# Patient Record
Sex: FEMALE | Race: WHITE | NOT HISPANIC OR LATINO | URBAN - METROPOLITAN AREA
[De-identification: names, ages, dates, MRNs, and addresses within clinical notes are randomized per-mention and may not be internally consistent; named-entity substitution may affect disease eponyms.]

---

## 2021-08-24 ENCOUNTER — TELEPHONE (OUTPATIENT)
Dept: PSYCHIATRY | Facility: CLINIC | Age: 61
End: 2021-08-24

## 2021-08-24 NOTE — TELEPHONE ENCOUNTER
Behavorial Health Outpatient Intake Questions    Referred by: Self referral      Please advised interviewee that they need to answer all questions truthfully to allow for best care and any misrepresentations of information may affect their ability to be seen at this clinic   => Was this discussed? Yes     Behavorial Health Outpatient Intake History -     Presenting Problem (in patient's words):   Bipolar    Are there any developmental disabilities? ? If yes, can they speak to you on the phone? If they are too limited to speak to you on phone, refer out Yes    Are you taking any psychiatric medications? Yes    => If yes, who prescribes? If yes, are they injectable  medications?   Latuda  Prozac    Does the patient have a language barrier or hearing impairment? No    Have you been treated at [facility] by a therapist or a doctor in the past? If yes, who? No    Has the patient been hospitalized for mental health? Yes   If yes, how long ago was last hospitalization and where was it?  2003 (more or less) - [place]    Do you actively use alcohol or marijuana or illegal substances? If yes, what and how much - refer out to Drug and alcohol treatment if use is excessive or daily use of illegal substances No concerns of substance abuse are reported.    Do you have a community treatment team or ? Yes, through [facility].    Legal History-     Does the patient have any history of arrests, correction/long term time, or DUIs? No  If Yes-  1) What types of charges?  2) When were they last incarcerated?  3) Are they currently on parole or probation?    Minor Child-    Who has custody of the child?     Is there a custody agreement?     If there is a custody agreement remind parent that they must bring a copy to the first appt or they will not be seen.     Intake Team, please check with provider before scheduling if flags come up such as:  - complex case  - legal history (other than DUI)  - communication barrier concerns are  present  - if, in your judgment, this needs further review    ACCEPTED as a patient Yes  => Appointment Date: 08/31/2021 at 9:00 a.m with Marylu Castrejon     Referred Elsewhere? No    Name of Insurance Co: [employer]- Medicaid/[facility]  Insurance ID# [id number]  Insurance Phone #  If ins is primary or secondary  If patient is a minor, parents information such as Name, D.O.B of guarantor.

## 2021-08-31 ENCOUNTER — SOCIAL WORK (OUTPATIENT)
Dept: BEHAVIORAL/MENTAL HEALTH CLINIC | Facility: CLINIC | Age: 61
End: 2021-08-31
Payer: COMMERCIAL

## 2021-08-31 DIAGNOSIS — F31.4 BIPOLAR DISORDER, CURRENT EPISODE DEPRESSED, SEVERE, WITHOUT PSYCHOTIC FEATURES (HCC): Primary | ICD-10-CM

## 2021-08-31 PROCEDURE — 90791 PSYCH DIAGNOSTIC EVALUATION: CPT | Performed by: COUNSELOR
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
## 2021-08-31 NOTE — BH TREATMENT PLAN
Anita Rajan  1960       Date of Initial Treatment Plan: 8/31/21   Date of Current Treatment Plan: 08/31/21    Treatment Plan Number 1     Strengths/Personal Resources for Self Care: Well establish coping skills, supportive family, caring , empathetic, compliance with medication    Diagnosis:   1  Bipolar disorder, current episode depressed, severe, without psychotic features ([facility] [address] )         Area of Needs: Healthy communication skills, coping skills, grief and loss      Long Term Goal 1: Decrease frequency, duration and intensity of depressive symptoms    Target Date: 2/27/22  Completion Date: 1/27/22         Short Term Objectives for Goal 1: 1  Teach and practice assetive communication skills 2  Increase use of relaxation skills  3  Increase use of healthy coping skills and self care    Long Term Goal 2: Process grief related symptoms    Target Date: 2/27/22  Completion Date: 1/27/22    Short Term Objectives for Goal 2: 1  Complete psycho education on grief and normalize feelings  2  Provide community resources for increased support as needed      GOAL 1: Modality: Individual 4x per month   Completion Date 2/27/22 and The person(s) responsible for carrying out the plan is  Rosalie Adames-Therapist    GOAL 2: Modality:  Individual 4x per month   Completion Date 2/27/22 and The person(s) responsible for carrying out the plan is  [employer]: Diagnosis and Treatment Plan explained to Aide Foster relates understanding diagnosis and is agreeable to Treatment Plan

## 2021-09-17 ENCOUNTER — TELEMEDICINE (OUTPATIENT)
Dept: BEHAVIORAL/MENTAL HEALTH CLINIC | Facility: CLINIC | Age: 61
End: 2021-09-17
Payer: COMMERCIAL

## 2021-09-17 DIAGNOSIS — F31.32 BIPOLAR 1 DISORDER, DEPRESSED, MODERATE (HCC): Primary | ICD-10-CM

## 2021-09-17 PROCEDURE — 90834 PSYTX W PT 45 MINUTES: CPT | Performed by: COUNSELOR

## 2021-09-20 PROBLEM — F31.32 BIPOLAR 1 DISORDER, DEPRESSED, MODERATE (HCC): Status: ACTIVE | Noted: 2021-09-20

## 2021-09-20 NOTE — PSYCH
Virtual Regular Visit    Verification of patient location:    Patient is located in the following state in which I hold an active license [place]      Assessment/Plan:    Problem List Items Addressed This Visit        Other    Bipolar 1 disorder, depressed, moderate ([address] ) - Primary          Goals addressed in session: Goal 1 and Goal 2          Reason for visit is   Chief Complaint   Patient presents with    Virtual Regular Visit        Encounter provider Young Fernandez    Provider located at [address] [place] [id number] [phone]      Recent Visits  No visits were found meeting these conditions  Showing recent visits within past 7 days and meeting all other requirements  Future Appointments  No visits were found meeting these conditions  Showing future appointments within next 150 days and meeting all other requirements       The patient was identified by name and date of birth  Nelly Taylor was informed that this is a telemedicine visit and that the visit is being conducted through[employer]avySw[employer] and patient was informed that this is a secure, HIPAA-compliant platform  She agrees to proceed     My office door was closed  No one else was in the room  She acknowledged consent and understanding of privacy and security of the video platform  The patient has agreed to participate and understands they can discontinue the visit at any time  Patient is aware this is a billable service  Subjective  Nelly Taylor is a 64 y o  female     D: Clinician met with Brenda Lynne via [employer] for individual therapy  Brenda Lynne presented to [facility] and was assisted by  in finding an office to complete session on her tablet  Brenda Guera reports some frustration at home due to her cat   Brenda Guera reports that she has been having trouble sleeping due to her cats increase in behavior at night and feeling worried that this will come an issues in her household as she is living with her daughter and their family  Dmitri Leslie reports that the cat as on a few occasions woken the family up and as been mischievous  Clinician explored Katherine's worries and discussed way to communicate this with her daughter  A: Dmitri Leslie was open and engaged in the session  She reports taking her medication as prescribed  She continues to reports cognitive issued related to memory  P: Continue to meet with Dmitri Leslie weekly for individual therapy  HPI     No past medical history on file  No past surgical history on file  No current outpatient medications on file  No current facility-administered medications for this visit  Not on File    Review of Systems    Video Exam    There were no vitals filed for this visit  Physical Exam     I spent 45 minutes directly with the patient during this visit    VIRTUAL VISIT DISCLAIMER    Alysha Saavedra verbally agrees to participate in [employer]  Pt is aware that [employer] could be limited without vital signs or the ability to perform a full hands-on physical Selena Irvin understands she or the provider may request at any time to terminate the video visit and request the patient to seek care or treatment in person

## 2021-09-23 ENCOUNTER — SOCIAL WORK (OUTPATIENT)
Dept: BEHAVIORAL/MENTAL HEALTH CLINIC | Facility: CLINIC | Age: 61
End: 2021-09-23
Payer: COMMERCIAL

## 2021-09-23 DIAGNOSIS — F31.32 BIPOLAR 1 DISORDER, DEPRESSED, MODERATE (HCC): Primary | ICD-10-CM

## 2021-09-23 PROCEDURE — 90834 PSYTX W PT 45 MINUTES: CPT | Performed by: COUNSELOR

## 2021-09-23 NOTE — PSYCH
Psychotherapy Provided: Individual Psychotherapy 45 minutes     Length of time in session: 45 minutes, follow up in 1 week    Encounter Diagnosis     ICD-10-CM    1  Bipolar 1 disorder, depressed, moderate (HCC)  F31 32        Goals addressed in session: Goal 1 and Goal 2     Current suicide risk : Low     D: Clinician met with Lizbeth Lora in person for individual therapy  Lizbeth Lora brought her tablet in for clinician to assist her with learning how to utilize telehealth [employer] for future appointments  Lizbeth Lora reports that she is doing well overall with sticking to her goals and has been able to pay off her credit card dept slowly  She express some concerns related to boundaries with her daughter and fear that she will begin to spend money on her again now that they are meeting more regularly  Clinician explored fears and ways to manage her boundaries with her daughter such as establishing the plan prior to meeting up with her and not straying from those plans and communicating her concerns to her daughter  Clinician checked in with Lizbeth Lora about her coping skills and regularly taking time for herself to relax which she reports that she has been reading  A: Lizbeth Lora was open and engaged in the session  She will continue with virtual appointments going forward due to risk of COVID19  P: Continue to meet with Lizbeth Lora weekly for individual therapy  Behavioral Health Treatment Plan [facility]: Diagnosis and Treatment Plan explained to Tay Carrera relates understanding diagnosis and is agreeable to Treatment Plan   Yes

## 2021-10-01 ENCOUNTER — TELEMEDICINE (OUTPATIENT)
Dept: BEHAVIORAL/MENTAL HEALTH CLINIC | Facility: CLINIC | Age: 61
End: 2021-10-01
Payer: COMMERCIAL

## 2021-10-01 DIAGNOSIS — F31.32 BIPOLAR 1 DISORDER, DEPRESSED, MODERATE (HCC): Primary | ICD-10-CM

## 2021-10-01 PROCEDURE — 90834 PSYTX W PT 45 MINUTES: CPT | Performed by: COUNSELOR

## 2021-10-15 ENCOUNTER — TELEMEDICINE (OUTPATIENT)
Dept: BEHAVIORAL/MENTAL HEALTH CLINIC | Facility: CLINIC | Age: 61
End: 2021-10-15
Payer: COMMERCIAL

## 2021-10-15 DIAGNOSIS — F31.32 BIPOLAR 1 DISORDER, DEPRESSED, MODERATE (HCC): Primary | ICD-10-CM

## 2021-10-15 PROCEDURE — 90834 PSYTX W PT 45 MINUTES: CPT | Performed by: COUNSELOR

## 2021-10-22 ENCOUNTER — TELEMEDICINE (OUTPATIENT)
Dept: BEHAVIORAL/MENTAL HEALTH CLINIC | Facility: CLINIC | Age: 61
End: 2021-10-22
Payer: COMMERCIAL

## 2021-10-22 DIAGNOSIS — F31.32 BIPOLAR 1 DISORDER, DEPRESSED, MODERATE (HCC): Primary | ICD-10-CM

## 2021-10-22 PROCEDURE — 90834 PSYTX W PT 45 MINUTES: CPT | Performed by: COUNSELOR

## 2021-10-29 ENCOUNTER — TELEMEDICINE (OUTPATIENT)
Dept: BEHAVIORAL/MENTAL HEALTH CLINIC | Facility: CLINIC | Age: 61
End: 2021-10-29
Payer: COMMERCIAL

## 2021-10-29 DIAGNOSIS — F31.32 BIPOLAR 1 DISORDER, DEPRESSED, MODERATE (HCC): Primary | ICD-10-CM

## 2021-10-29 PROCEDURE — 90834 PSYTX W PT 45 MINUTES: CPT | Performed by: COUNSELOR

## 2021-11-05 ENCOUNTER — TELEMEDICINE (OUTPATIENT)
Dept: BEHAVIORAL/MENTAL HEALTH CLINIC | Facility: CLINIC | Age: 61
End: 2021-11-05
Payer: COMMERCIAL

## 2021-11-05 DIAGNOSIS — F31.32 BIPOLAR 1 DISORDER, DEPRESSED, MODERATE (HCC): Primary | ICD-10-CM

## 2021-11-05 PROCEDURE — 90834 PSYTX W PT 45 MINUTES: CPT | Performed by: COUNSELOR

## 2021-11-12 ENCOUNTER — TELEMEDICINE (OUTPATIENT)
Dept: BEHAVIORAL/MENTAL HEALTH CLINIC | Facility: CLINIC | Age: 61
End: 2021-11-12
Payer: COMMERCIAL

## 2021-11-12 DIAGNOSIS — F31.32 BIPOLAR 1 DISORDER, DEPRESSED, MODERATE (HCC): Primary | ICD-10-CM

## 2021-11-12 PROCEDURE — 90832 PSYTX W PT 30 MINUTES: CPT | Performed by: COUNSELOR

## 2021-11-19 ENCOUNTER — TELEMEDICINE (OUTPATIENT)
Dept: BEHAVIORAL/MENTAL HEALTH CLINIC | Facility: CLINIC | Age: 61
End: 2021-11-19
Payer: COMMERCIAL

## 2021-11-19 DIAGNOSIS — F31.32 BIPOLAR 1 DISORDER, DEPRESSED, MODERATE (HCC): Primary | ICD-10-CM

## 2021-11-19 PROCEDURE — 90834 PSYTX W PT 45 MINUTES: CPT | Performed by: COUNSELOR

## 2021-12-03 ENCOUNTER — TELEMEDICINE (OUTPATIENT)
Dept: BEHAVIORAL/MENTAL HEALTH CLINIC | Facility: CLINIC | Age: 61
End: 2021-12-03
Payer: COMMERCIAL

## 2021-12-03 DIAGNOSIS — F31.32 BIPOLAR 1 DISORDER, DEPRESSED, MODERATE (HCC): Primary | ICD-10-CM

## 2021-12-03 PROCEDURE — 90834 PSYTX W PT 45 MINUTES: CPT | Performed by: COUNSELOR

## 2021-12-10 ENCOUNTER — TELEMEDICINE (OUTPATIENT)
Dept: BEHAVIORAL/MENTAL HEALTH CLINIC | Facility: CLINIC | Age: 61
End: 2021-12-10
Payer: COMMERCIAL

## 2021-12-10 DIAGNOSIS — F31.32 BIPOLAR 1 DISORDER, DEPRESSED, MODERATE (HCC): Primary | ICD-10-CM

## 2021-12-10 PROCEDURE — 90834 PSYTX W PT 45 MINUTES: CPT | Performed by: COUNSELOR

## 2021-12-17 ENCOUNTER — TELEMEDICINE (OUTPATIENT)
Dept: BEHAVIORAL/MENTAL HEALTH CLINIC | Facility: CLINIC | Age: 61
End: 2021-12-17
Payer: COMMERCIAL

## 2021-12-17 DIAGNOSIS — F31.32 BIPOLAR 1 DISORDER, DEPRESSED, MODERATE (HCC): Primary | ICD-10-CM

## 2021-12-17 PROCEDURE — 90834 PSYTX W PT 45 MINUTES: CPT | Performed by: COUNSELOR

## 2021-12-23 ENCOUNTER — TELEMEDICINE (OUTPATIENT)
Dept: BEHAVIORAL/MENTAL HEALTH CLINIC | Facility: CLINIC | Age: 61
End: 2021-12-23
Payer: COMMERCIAL

## 2021-12-23 DIAGNOSIS — F31.32 BIPOLAR 1 DISORDER, DEPRESSED, MODERATE (HCC): Primary | ICD-10-CM

## 2021-12-23 PROCEDURE — 90834 PSYTX W PT 45 MINUTES: CPT | Performed by: COUNSELOR

## 2022-01-07 ENCOUNTER — TELEMEDICINE (OUTPATIENT)
Dept: BEHAVIORAL/MENTAL HEALTH CLINIC | Facility: CLINIC | Age: 62
End: 2022-01-07
Payer: COMMERCIAL

## 2022-01-07 DIAGNOSIS — F31.32 BIPOLAR 1 DISORDER, DEPRESSED, MODERATE (HCC): Primary | ICD-10-CM

## 2022-01-07 PROCEDURE — 90834 PSYTX W PT 45 MINUTES: CPT | Performed by: COUNSELOR

## 2022-01-07 NOTE — PSYCH
Virtual Regular Visit    Verification of patient location:    Patient is located in the following state in which I hold an active license [place]      Assessment/Plan:    Problem List Items Addressed This Visit        Other    Bipolar 1 disorder, depressed, moderate ([facility] [address] ) - Primary          Goals addressed in session: Goal 1 and Goal 2          Reason for visit is No chief complaint on file  Encounter provider Betsy Londono    Provider located at [address] [place] [id number] [phone]      Recent Visits  No visits were found meeting these conditions  Showing recent visits within past 7 days and meeting all other requirements  Future Appointments  No visits were found meeting these conditions  Showing future appointments within next 150 days and meeting all other requirements       The patient was identified by name and date of birth  Vimal Waller was informed that this is a telemedicine visit and that the visit is being conducted through[employer] and patient was informed this is a secure, HIPAA-complaint platform  She agrees to proceed     My office door was closed  No one else was in the room  She acknowledged consent and understanding of privacy and security of the video platform  The patient has agreed to participate and understands they can discontinue the visit at any time  Patient is aware this is a billable service  Subjective  Vimal Waller is a 64 y o  female     D: Clinician met with Shy Woodard via telehealth for individual therapy  Shy Woodard report cycling from hypomanic and depression with increased environmental stressors  Clinician explores symptoms and stressors and ways Shy Woodard is working on managing symptoms with boundaries  Shy Woodard reports limited interaction with her daughter as she refuses to follow through with CYS recommendations to get her children back   Shy Woodard reports continuing to take emi medication as prescribed and turning off her phone regularly to avoid contact with daughter  A: Jamaal Becerra was open and engaged in the session and reports awareness of quick changed mood and intention to follow through with her doctor  P: Continue to meet with Jamaal Becerra weekly for individual therapy  HPI     No past medical history on file  No past surgical history on file  No current outpatient medications on file  No current facility-administered medications for this visit  Not on File    Review of Systems    Video Exam    There were no vitals filed for this visit  Physical Exam     I spent 45 minutes directly with the patient during this visit    VIRTUAL VISIT DISCLAIMER    Vickie Spatz verbally agrees to participate in [employer]  Pt is aware that [employer] could be limited without vital signs or the ability to perform a full hands-on physical Sonali Ann understands she or the provider may request at any time to terminate the video visit and request the patient to seek care or treatment in person

## 2022-01-14 ENCOUNTER — TELEMEDICINE (OUTPATIENT)
Dept: BEHAVIORAL/MENTAL HEALTH CLINIC | Facility: CLINIC | Age: 62
End: 2022-01-14
Payer: COMMERCIAL

## 2022-01-14 DIAGNOSIS — F31.32 BIPOLAR 1 DISORDER, DEPRESSED, MODERATE (HCC): Primary | ICD-10-CM

## 2022-01-14 PROCEDURE — 90834 PSYTX W PT 45 MINUTES: CPT | Performed by: COUNSELOR

## 2022-01-14 NOTE — PSYCH
Virtual Regular Visit    Verification of patient location:    Patient is located in the following state in which I hold an active license [place]      Assessment/Plan:    Problem List Items Addressed This Visit        Other    Bipolar 1 disorder, depressed, moderate ([address] ) - Primary          Goals addressed in session: Goal 1 and Goal 2          Reason for visit is No chief complaint on file  Encounter provider Renato Iqbal    Provider located at [address] [place] [id number] [phone]      Recent Visits  Date Type Provider Dept   01/07/22 310 [facility]   Showing recent visits within past 7 days and meeting all other requirements  Future Appointments  No visits were found meeting these conditions  Showing future appointments within next 150 days and meeting all other requirements       The patient was identified by name and date of birth  Emi Hyde was informed that this is a telemedicine visit and that the visit is being conducted throughEpic Embedded and patient was informed this is a secure, HIPAA-complaint platform  She agrees to proceed     My office door was closed  No one else was in the room  She acknowledged consent and understanding of privacy and security of the video platform  The patient has agreed to participate and understands they can discontinue the visit at any time  Patient is aware this is a billable service  Subjective  Emi Hyde is a 64 y o  female     D: Clinician met with [place] Garcia via telehealth for individual therapy  [place] Garcia reports worry over her daughters declining mental health  She reports worry over her health and the wellbeing of her grandchildren  Clinician validated and normalized her feelings and discussed her boundaries and how they are helping to maintain distance from stress    A: [place] Garcia presented with stable mood and affect and reports she is taking her medication as prescribed and following through with doctors appointments  P: Continue to meet with Patric Buerger weekly for individual therapy  HPI     No past medical history on file  No past surgical history on file  No current outpatient medications on file  No current facility-administered medications for this visit  Not on File    Review of Systems    Video Exam    There were no vitals filed for this visit  Physical Exam     I spent 45 minutes directly with the patient during this visit    VIRTUAL VISIT DISCLAIMER    Domo Segundo verbally agrees to participate in [employer]  Pt is aware that [employer] could be limited without vital signs or the ability to perform a full hands-on physical Della Setting understands she or the provider may request at any time to terminate the video visit and request the patient to seek care or treatment in person

## 2022-01-21 ENCOUNTER — TELEMEDICINE (OUTPATIENT)
Dept: BEHAVIORAL/MENTAL HEALTH CLINIC | Facility: CLINIC | Age: 62
End: 2022-01-21
Payer: COMMERCIAL

## 2022-01-21 DIAGNOSIS — F31.32 BIPOLAR 1 DISORDER, DEPRESSED, MODERATE (HCC): Primary | ICD-10-CM

## 2022-01-21 PROCEDURE — 90834 PSYTX W PT 45 MINUTES: CPT | Performed by: COUNSELOR

## 2022-01-21 NOTE — PSYCH
Virtual Regular Visit    Verification of patient location:    Patient is located in the following state in which I hold an active license [place]      Assessment/Plan:    Problem List Items Addressed This Visit        Other    Bipolar 1 disorder, depressed, moderate ([address] ) - Primary          Goals addressed in session: Goal 1 and Goal 2          Reason for visit is No chief complaint on file  Encounter provider Micha Moreno    Provider located at [address] [place] [id number] [phone]      Recent Visits  Date Type Provider Dept   01/14/22 310 [facility]   Showing recent visits within past 7 days and meeting all other requirements  Future Appointments  No visits were found meeting these conditions  Showing future appointments within next 150 days and meeting all other requirements       The patient was identified by name and date of birth  Jhon Bhakta was informed that this is a telemedicine visit and that the visit is being conducted throughEpic Embedded and patient was informed this is a secure, HIPAA-complaint platform  She agrees to proceed     My office door was closed  No one else was in the room  She acknowledged consent and understanding of privacy and security of the video platform  The patient has agreed to participate and understands they can discontinue the visit at any time  Patient is aware this is a billable service  Subjective  Jhon Bhakta is a 64 y o  female     D: Clinician met with West allis via telehealth for individual therapy  West allyarelis reports a feeling of not belonging and constant worry about her cats behavior at her daughters house  She reports fear that her daughter would as her to get rid of her cats  Clinician explored increased stress and possible solutions to decrease symptoms   Clinician encouraged healthy communication with daughter and express her feelings  Clinician explored past communication with others and ways to impove openness  A: Leopoldo Orozco was open and engaged in the session and presented with stable mood and affect  P: Continue to meet with Leopoldo Orozco weekly for individual therapy  HPI     No past medical history on file  No past surgical history on file  No current outpatient medications on file  No current facility-administered medications for this visit  Not on File    Review of Systems    Video Exam    There were no vitals filed for this visit  Physical Exam     I spent 45 minutes directly with the patient during this visit    VIRTUAL VISIT DISCLAIMER    Stephany Hunter verbally agrees to participate in [employer]  Pt is aware that [employer] could be limited without vital signs or the ability to perform a full hands-on physical Boom Dent understands she or the provider may request at any time to terminate the video visit and request the patient to seek care or treatment in person

## 2022-01-28 ENCOUNTER — TELEMEDICINE (OUTPATIENT)
Dept: BEHAVIORAL/MENTAL HEALTH CLINIC | Facility: CLINIC | Age: 62
End: 2022-01-28
Payer: COMMERCIAL

## 2022-01-28 DIAGNOSIS — F31.32 BIPOLAR 1 DISORDER, DEPRESSED, MODERATE (HCC): Primary | ICD-10-CM

## 2022-01-28 PROCEDURE — 90834 PSYTX W PT 45 MINUTES: CPT | Performed by: COUNSELOR

## 2022-01-28 NOTE — PSYCH
Virtual Regular Visit    Verification of patient location:    Patient is located in the following state in which I hold an active license [place]      Assessment/Plan:    Problem List Items Addressed This Visit        Other    Bipolar 1 disorder, depressed, moderate ([address] ) - Primary          Goals addressed in session: Goal 1 and Goal 2          Reason for visit is No chief complaint on file  Encounter provider Bentley George    Provider located at [address] [place] [address] [phone]      Recent Visits  Date Type Provider Dept   01/21/22 310 [facility]   Showing recent visits within past 7 days and meeting all other requirements  Future Appointments  No visits were found meeting these conditions  Showing future appointments within next 150 days and meeting all other requirements       The patient was identified by name and date of birth  Pura Andrade was informed that this is a telemedicine visit and that the visit is being conducted throughEpic Embedded and patient was informed this is a secure, HIPAA-complaint platform  She agrees to proceed  My office door was closed  No one else was in the room  She acknowledged consent and understanding of privacy and security of the video platform  The patient has agreed to participate and understands they can discontinue the visit at any time  Patient is aware this is a billable service  Subjective  Pura Andrade is a 64 y o  female     D: Clinician met with Liliya Bergeron via telehealth for individual therapy  Liliya Bergreon discussed recent increase in communication and putting herself out there more to her daughter to connect and build better relationship  Clinician explored and reinforced this  Liliya Bergeron reports overall feeling down in the last few weeks   Clinician normalized feelings and possible ways to improve mood through coping skills and regular exercise  Ozzy Hardin discussed continued efforts to set and maintain boundaries with her youngest daughter  A: Ozzy Wilfred was open and engaged in the session  She reports that she has recently had her medication increased by her doctor and that she is taking the medication as directed  P: Continue to meet with Ozzy Hardin weekly for individual therapy  HPI     No past medical history on file  No past surgical history on file  No current outpatient medications on file  No current facility-administered medications for this visit  Not on File    Review of Systems    Video Exam    There were no vitals filed for this visit  Physical Exam     I spent 45 minutes directly with the patient during this visit    VIRTUAL VISIT DISCLAIMER    Rossy Cook verbally agrees to participate in [employer]  Pt is aware that [employer] could be limited without vital signs or the ability to perform a full hands-on physical Enrico Banks understands she or the provider may request at any time to terminate the video visit and request the patient to seek care or treatment in person

## 2022-02-04 ENCOUNTER — TELEMEDICINE (OUTPATIENT)
Dept: BEHAVIORAL/MENTAL HEALTH CLINIC | Facility: CLINIC | Age: 62
End: 2022-02-04
Payer: COMMERCIAL

## 2022-02-04 DIAGNOSIS — F31.32 BIPOLAR 1 DISORDER, DEPRESSED, MODERATE (HCC): Primary | ICD-10-CM

## 2022-02-04 PROCEDURE — 90834 PSYTX W PT 45 MINUTES: CPT | Performed by: COUNSELOR

## 2022-02-04 NOTE — PSYCH
Virtual Regular Visit    Verification of patient location:    Patient is located in the following state in which I hold an active license [place]      Assessment/Plan:    Problem List Items Addressed This Visit        Other    Bipolar 1 disorder, depressed, moderate ([address] ) - Primary          Goals addressed in session: Goal 1 and Goal 2          Reason for visit is No chief complaint on file  Encounter provider Isaiah Mullen    Provider located at [address] [place] [id number] [phone]      Recent Visits  Date Type Provider Dept   01/28/22 310 [facility]   Showing recent visits within past 7 days and meeting all other requirements  Future Appointments  No visits were found meeting these conditions  Showing future appointments within next 150 days and meeting all other requirements       The patient was identified by name and date of birth  Darrell Ramos was informed that this is a telemedicine visit and that the visit is being conducted throughEpic Embedded and patient was informed this is a secure, HIPAA-complaint platform  She agrees to proceed     My office door was closed  No one else was in the room  She acknowledged consent and understanding of privacy and security of the video platform  The patient has agreed to participate and understands they can discontinue the visit at any time  Patient is aware this is a billable service  Subjective  Darrell Ramos is a 64 y o  female     D: Clinician met with Jane Geiger via telehealth for individual therapy  Jane Geiger processed upcoming social plans over the weekend to spend time with family for a birthday party  Clinician explored benefits of social interactions and focusing on hobbies and her own interests  Clinician explored possible activities to get her out of the home   She discussed focusing more of her [address] and plan to go to the arts store in order to check out other art options  Clinician discussed memory related concerns and symptoms of anxiety that can have an impact on memory  A: Alice Stevens was open and engaged in the session  She reports taking her medication as prescribed and efforts to work on healthy coping skills  P: Continue to meet with Alice Stevens every week for individual therapy  HPI     No past medical history on file  No past surgical history on file  No current outpatient medications on file  No current facility-administered medications for this visit  Not on File    Review of Systems    Video Exam    There were no vitals filed for this visit  Physical Exam     I spent 45 minutes directly with the patient during this visit    VIRTUAL VISIT DISCLAIMER    Nereida Gorman verbally agrees to participate in [employer]  Pt is aware that [employer] could be limited without vital signs or the ability to perform a full hands-on physical Denia Hallmark understands she or the provider may request at any time to terminate the video visit and request the patient to seek care or treatment in person

## 2022-02-11 ENCOUNTER — TELEMEDICINE (OUTPATIENT)
Dept: BEHAVIORAL/MENTAL HEALTH CLINIC | Facility: CLINIC | Age: 62
End: 2022-02-11
Payer: COMMERCIAL

## 2022-02-11 DIAGNOSIS — F31.32 BIPOLAR 1 DISORDER, DEPRESSED, MODERATE (HCC): Primary | ICD-10-CM

## 2022-02-11 PROCEDURE — 90834 PSYTX W PT 45 MINUTES: CPT | Performed by: COUNSELOR

## 2022-02-11 NOTE — PSYCH
Virtual Regular Visit    Verification of patient location:    Patient is located in the following state in which I hold an [place] license [place]      Assessment/Plan:    Problem List Items Addressed This Visit        Other    Bipolar 1 disorder, depressed, moderate ([address] ) - Primary          Goals addressed in session: Goal 1 and Goal 2          Reason for visit is No chief complaint on file  Encounter provider Adolph Klinefelter    Provider located at [address] [place] [id number] [phone]      Recent Visits  Date Type Provider Dept   02/04/22 310 [facility]   Showing recent visits within past 7 days and meeting all other requirements  Future Appointments  No visits were found meeting these conditions  Showing future appointments within next 150 days and meeting all other requirements       The patient was identified by name and date of birth  Gamaliel Guzmán was informed that this is a telemedicine visit and that the visit is being conducted throughEpic Embedded and patient was informed this is a secure, HIPAA-complaint platform  She agrees to proceed     My office door was closed  No one else was in the room  She acknowledged consent and understanding of privacy and security of the video platform  The patient has agreed to participate and understands they can discontinue the visit at any time  Patient is aware this is a billable service  Subjective  Gamaliel Guzmán is a 64 y o  female     D: Clinician met with Teri Dudley via telehealth for individual therapy  Teri Dudley reports hearing about a death of an old friend during the week and having increased grief related symptoms  Clinician explored symptoms and validated grief  Teri Octavia reports thinking more about her own life and changes she would like to make to be more mindful and increase happiness and chhaya  Clinician discussed small changes she would like to make and benefits of this  General Johnson reports stress related to her youngest daughters health and ways she is managing symptoms  A: General Johnson was open and engaged in the session and reports taking her medication as prescribed  P: Continue to meet with General Johnson weekly for individual therapy  HPI     No past medical history on file  No past surgical history on file  No current outpatient medications on file  No current facility-administered medications for this visit  Not on File    Review of Systems    Video Exam    There were no vitals filed for this visit  Physical Exam     I spent 45 minutes directly with the patient during this visit    VIRTUAL VISIT DISCLAIMER    Kristen Barriga verbally agrees to participate in [employer]  Pt is aware that [employer] could be limited without vital signs or the ability to perform a full hands-on physical Yadira Alert understands she or the provider may request at any time to terminate the video visit and request the patient to seek care or treatment in person

## 2022-02-18 ENCOUNTER — TELEMEDICINE (OUTPATIENT)
Dept: BEHAVIORAL/MENTAL HEALTH CLINIC | Facility: CLINIC | Age: 62
End: 2022-02-18
Payer: COMMERCIAL

## 2022-02-18 DIAGNOSIS — F31.32 BIPOLAR 1 DISORDER, DEPRESSED, MODERATE (HCC): Primary | ICD-10-CM

## 2022-02-18 PROCEDURE — 90834 PSYTX W PT 45 MINUTES: CPT | Performed by: COUNSELOR

## 2022-02-18 NOTE — PSYCH
Virtual Regular Visit    Verification of patient location:    Patient is located in the following state in which I hold an [place] license [place]      Assessment/Plan:    Problem List Items Addressed This Visit        Other    Bipolar 1 disorder, depressed, moderate ([address] ) - Primary          Goals addressed in session: Goal 1 and Goal 2          Reason for visit is No chief complaint on file  Encounter provider Burgess Coyne    Provider located at [address] [place] [address] [phone]      Recent Visits  Date Type Provider Dept   02/11/22 310 [facility]   Showing recent visits within past 7 days and meeting all other requirements  Future Appointments  No visits were found meeting these conditions  Showing future appointments within next 150 days and meeting all other requirements       The patient was identified by name and date of birth  Bridget Adorno was informed that this is a telemedicine visit and that the visit is being conducted throughEpic Embedded and patient was informed this is a secure, HIPAA-complaint platform  She agrees to proceed  My office door was closed  No one else was in the room  She acknowledged consent and understanding of privacy and security of the video platform  The patient has agreed to participate and understands they can discontinue the visit at any time  Patient is aware this is a billable service  Subjective  Bridget Adorno is a 64 y o  female     D: Clinician met with Cassia Woodard via telehealth for individual therapy  Cassia Woodard discussed incident with son in law that left her feeling uncomfortable and hurt  Clinician explored and discussed cognitive restructuring  Cassia Woodard was able to discussed possible ways to communicate feeling to her daughter and son in law and work on better bonding in the family   Clinician discussed treatment plan goals and updated treatment plan  A: Antonino Hernadez presented with stable mood and affect and reports taking her medication as prescribed  P: Continue to meet with Antonino Hernadez weekly for individual therapy  HPI     No past medical history on file  No past surgical history on file  No current outpatient medications on file  No current facility-administered medications for this visit  Allergies   Allergen Reactions    Lithium Dizziness and Headache    Depakote [Valproic Acid] Rash    Geodon [Ziprasidone] Rash    Lamictal [Lamotrigine] Rash       Review of Systems    Video Exam    There were no vitals filed for this visit  Physical Exam     I spent 45 minutes directly with the patient during this visit    VIRTUAL VISIT DISCLAIMER    Sharonda Rapp verbally agrees to participate in [employer]  Pt is aware that [employer] could be limited without vital signs or the ability to perform a full hands-on physical Edith Saavedra understands she or the provider may request at any time to terminate the video visit and request the patient to seek care or treatment in person

## 2022-02-18 NOTE — BH TREATMENT PLAN
Brina Kugel  1960         Date of Initial Treatment Plan: 8/31/21   Date of Current Treatment Plan: 2/18/22     Treatment Plan Number 2     Strengths/Personal Resources for Self Care: Well establish coping skills, supportive family, caring , empathetic, compliant with medication     Diagnosis:   1  Bipolar disorder, current episode depressed, severe, without psychotic features ([facility] [address] )            Area of Needs: Healthy communication skills, coping skills, grief and loss        Long Term Goal 1: Decrease frequency, duration and intensity of depressive symptoms     Target Date: 7/18/22  Completion Date: 8/18/22         Short Term Objectives for Goal 1: 1  Teach and practice assetive communication skills 2  Increase use of relaxation skills  3  Increase use of healthy coping skills and self care     Long Term Goal 2: Process grief related symptoms     Target Date: 7/18/22  Completion Date: 8/18/22     Short Term Objectives for Goal 2: 1  Complete psycho education on grief and normalize feelings  2  Provide community resources for increased support as needed        GOAL 1: Modality: Individual 4x per month   Completion Date 8/18/22 and The person(s) responsible for carrying out the plan is  Jamaal Becerra and Caden Benitez     GOAL 2: Modality:  Individual 4x per month   Completion Date 8/18/22 and The person(s) responsible for carrying out the plan is  Jamaal Becerra and Ed Tong: Diagnosis and Treatment Plan explained to Eron Soriano relates understanding diagnosis and is agreeable to Treatment Plan  Jamaal Becerra gave verbal consent for the completion of the treatment plan via telehealth due to [address] social distancing

## 2022-02-25 ENCOUNTER — TELEMEDICINE (OUTPATIENT)
Dept: BEHAVIORAL/MENTAL HEALTH CLINIC | Facility: CLINIC | Age: 62
End: 2022-02-25
Payer: COMMERCIAL

## 2022-02-25 DIAGNOSIS — F31.32 BIPOLAR 1 DISORDER, DEPRESSED, MODERATE (HCC): Primary | ICD-10-CM

## 2022-02-25 PROCEDURE — 90834 PSYTX W PT 45 MINUTES: CPT | Performed by: COUNSELOR

## 2022-02-25 NOTE — PSYCH
Virtual Regular Visit    Verification of patient location:    Patient is located in the following state in which I hold an active license [place]      Assessment/Plan:    Problem List Items Addressed This Visit        Other    Bipolar 1 disorder, depressed, moderate ([address] ) - Primary          Goals addressed in session: Goal 1 and Goal 2          Reason for visit is No chief complaint on file  Encounter provider Betsy Londono    Provider located at [address] [place] [id number] [phone]      Recent Visits  Date Type Provider Dept   02/18/22 310 [facility]   Showing recent visits within past 7 days and meeting all other requirements  Future Appointments  No visits were found meeting these conditions  Showing future appointments within next 150 days and meeting all other requirements       The patient was identified by name and date of birth  Vimal Waller was informed that this is a telemedicine visit and that the visit is being conducted throughEpic Embedded and patient was informed this is a secure, HIPAA-complaint platform  She agrees to proceed     My office door was closed  No one else was in the room  She acknowledged consent and understanding of privacy and security of the video platform  The patient has agreed to participate and understands they can discontinue the visit at any time  Patient is aware this is a billable service  Subjective  Vimal Waller is a 64 y o  female     D: Clinician met with Shy Woodard via telehealth for individual therapy  Shy Woodard reports anniversary of her paramours death two years ago and continued grief related symptoms  Clinician reflected and normalized feelings and processed grief over time  Shy Woodard reports plans to look through old pictures and reflect on paramour and good memories   Clinician discussed other supports that she can reach out to if needed  Sj Holden reports speaking with paramours family member and her daughter  Clinician reinforced and encouraged this  Sj Holden reports that she will be home alone for the next week as her daughters family is taking a vacation  Sj Holden reports plan to be productive but also take time for self care  A: Sj Holden was open and engaged in the session  She reports decrease of medication by doctor and feeling good with the titration process so far  P: Continue to meet with Sj Holden weekly for individual therapy  HPI     No past medical history on file  No past surgical history on file  No current outpatient medications on file  No current facility-administered medications for this visit  Allergies   Allergen Reactions    Lithium Dizziness and Headache    Depakote [Valproic Acid] Rash    Geodon [Ziprasidone] Rash    Lamictal [Lamotrigine] Rash       Review of Systems    Video Exam    There were no vitals filed for this visit  Physical Exam     I spent 45 minutes directly with the patient during this visit    VIRTUAL VISIT DISCLAIMER    Salina Macedo verbally agrees to participate in [employer]  Pt is aware that [employer] could be limited without vital signs or the ability to perform a full hands-on physical Hola Kincaid understands she or the provider may request at any time to terminate the video visit and request the patient to seek care or treatment in person

## 2022-03-11 ENCOUNTER — TELEMEDICINE (OUTPATIENT)
Dept: BEHAVIORAL/MENTAL HEALTH CLINIC | Facility: CLINIC | Age: 62
End: 2022-03-11
Payer: COMMERCIAL

## 2022-03-11 DIAGNOSIS — F31.32 BIPOLAR 1 DISORDER, DEPRESSED, MODERATE (HCC): Primary | ICD-10-CM

## 2022-03-11 PROCEDURE — 90834 PSYTX W PT 45 MINUTES: CPT | Performed by: COUNSELOR

## 2022-03-11 NOTE — PSYCH
Virtual Regular Visit    Verification of patient location:    Patient is located in the following state in which I hold an [place] license [place]      Assessment/Plan:    Problem List Items Addressed This Visit        Other    Bipolar 1 disorder, depressed, moderate ([facility] [address] ) - Primary          Goals addressed in session: Goal 1 and Goal 2          Reason for visit is No chief complaint on file  Encounter provider Isi Bowers    Provider located at [address] [place] [address] [address] [place] [address] [phone]      Recent Visits  No visits were found meeting these conditions  Showing recent visits within past 7 days and meeting all other requirements  Future Appointments  No visits were found meeting these conditions  Showing future appointments within next 150 days and meeting all other requirements       The patient was identified by name and date of birth  Soo Barrientos was informed that this is a telemedicine visit and that the visit is being conducted [email or web address] Embedded and patient was informed this is a secure, HIPAA-complaint platform  She agrees to proceed     My office door was closed  No one else was in the room  She acknowledged consent and understanding of privacy and security of the video platform  The patient has agreed to participate and understands they can discontinue the visit at any time  Patient is aware this is a billable service  Subjective  Soo Barrientos is a 64 y o  female      D: Clinician met with Letty Pimentel via telehealth for individual therapy  Letty Pimentel discussed past week spend at her home while her daughters family is away  She reports enjoying the time by herself and reports being busy visiting with family and running errands  She reports conflict with daughter when she returned due to not following rules that she put into place   Clinician processed this and ways to communicate with daughter about this and understanding of boundary  Vianney Ashby discussed follow through with her doctor about medication concerns and working to figure out SSI in there future  Clinician encouraged and praised follow through  A: Vianney Ashby was open and engaged in the session and presented with stable mood and affect  P: Continue to meet with Vianney Ashby weekly for individual therapy  HPI      No past medical history on file  No past surgical history on file  No current outpatient medications on file  No current facility-administered medications for this visit  Allergies   Allergen Reactions    Lithium Dizziness and Headache    Depakote [Valproic Acid] Rash    Geodon [Ziprasidone] Rash    Lamictal [Lamotrigine] Rash       Review of Systems    Video Exam    There were no vitals filed for this visit  Physical Exam     I spent 45 minutes directly with the patient during this visit    VIRTUAL VISIT DISCLAIMER    Tito Rucker verbally agrees to participate in [employer]  Pt is aware that [employer] could be limited without vital signs or the ability to perform a full hands-on physical Kristin Ma understands she or the provider may request at any time to terminate the video visit and request the patient to seek care or treatment in person

## 2022-03-16 ENCOUNTER — TELEPHONE (OUTPATIENT)
Dept: PSYCHIATRY | Facility: CLINIC | Age: 62
End: 2022-03-16

## 2022-03-18 ENCOUNTER — TELEMEDICINE (OUTPATIENT)
Dept: BEHAVIORAL/MENTAL HEALTH CLINIC | Facility: CLINIC | Age: 62
End: 2022-03-18
Payer: COMMERCIAL

## 2022-03-18 DIAGNOSIS — F31.32 BIPOLAR 1 DISORDER, DEPRESSED, MODERATE (HCC): Primary | ICD-10-CM

## 2022-03-18 PROCEDURE — 90834 PSYTX W PT 45 MINUTES: CPT | Performed by: COUNSELOR

## 2022-03-18 NOTE — PSYCH
Virtual Regular Visit    Verification of patient location:    Patient is located in the following state in which I hold an active license [place]      Assessment/Plan:    Problem List Items Addressed This Visit        Other    Bipolar 1 disorder, depressed, moderate ([address] ) - Primary          Goals addressed in session: Goal 1 and Goal 2          Reason for visit is No chief complaint on file  Encounter provider Buck Fragoso    Provider located at [address] [place] [id number] [phone]      Recent Visits  Date Type Provider Dept   03/11/22 310 [facility]   Showing recent visits within past 7 days and meeting all other requirements  Future Appointments  No visits were found meeting these conditions  Showing future appointments within next 150 days and meeting all other requirements       The patient was identified by name and date of birth  Seng Verde was informed that this is a telemedicine visit and that the visit is being conducted through[employer] Embedded and patient was informed this is a secure, HIPAA-complaint platform  She agrees to proceed     My office door was closed  No one else was in the room  She acknowledged consent and understanding of privacy and security of the video platform  The patient has agreed to participate and understands they can discontinue the visit at any time  Patient is aware this is a billable service  Subjective  Seng Verde is a 58 y o  female     D: Clinician met with Brittany Mazariegos via telehealth for individual therapy  Giovannytriston Him discussed stressors related to her daughters continued negative behaviors  She processed thoughts of going to see her and possible consequences if she were to  Lonzie Him reports feeling guilty about not being able to help  Clinician explored guilt and reframed negative thinking   She reports attempts at maintaining boundaries  Clinician gave feedback and praised self awareness of importance of taking space and putting her own mental health first   A: Cristal Ramírez presented with stable mood and affect  She reports taking her medication as prescribed and has reached out for social support as needed  P: Continue to meet with Cristal Ramírez weekly for individual therapy  HPI     No past medical history on file  No past surgical history on file  No current outpatient medications on file  No current facility-administered medications for this visit  Allergies   Allergen Reactions    Lithium Dizziness and Headache    Depakote [Valproic Acid] Rash    Geodon [Ziprasidone] Rash    Lamictal [Lamotrigine] Rash       Review of Systems    Video Exam    There were no vitals filed for this visit  Physical Exam     I spent 45 minutes directly with the patient during this visit    VIRTUAL VISIT DISCLAIMER    Angelito Lechuga verbally agrees to participate in [employer]  Pt is aware that [employer] could be limited without vital signs or the ability to perform a full hands-on physical Mily Poe understands she or the provider may request at any time to terminate the video visit and request the patient to seek care or treatment in person

## 2022-03-23 ENCOUNTER — TELEPHONE (OUTPATIENT)
Dept: PSYCHIATRY | Facility: CLINIC | Age: 62
End: 2022-03-23

## 2022-03-23 NOTE — TELEPHONE ENCOUNTER
Niki Brooklynn cancelled her therapy appointment for 3/25/22  She has conflicting appointments on that day  She will come in for next scheduled appointment

## 2022-04-01 ENCOUNTER — TELEMEDICINE (OUTPATIENT)
Dept: BEHAVIORAL/MENTAL HEALTH CLINIC | Facility: CLINIC | Age: 62
End: 2022-04-01
Payer: COMMERCIAL

## 2022-04-01 DIAGNOSIS — F31.32 BIPOLAR 1 DISORDER, DEPRESSED, MODERATE (HCC): Primary | ICD-10-CM

## 2022-04-01 PROCEDURE — 90834 PSYTX W PT 45 MINUTES: CPT | Performed by: COUNSELOR

## 2022-04-01 NOTE — PSYCH
Virtual Regular Visit    Verification of patient location:    Patient is located in the following state in which I hold an active license [place]      Assessment/Plan:    Problem List Items Addressed This Visit        Other    Bipolar 1 disorder, depressed, moderate ([address] ) - Primary          Goals addressed in session: Goal 1 and Goal 2          Reason for visit is No chief complaint on file  Encounter provider Jane Puentes    Provider located at [address] [place] [id number] [phone]      Recent Visits  No visits were found meeting these conditions  Showing recent visits within past 7 days and meeting all other requirements  Future Appointments  No visits were found meeting these conditions  Showing future appointments within next 150 days and meeting all other requirements       The patient was identified by name and date of birth  Eriberto Miramontes was informed that this is a telemedicine visit and that the visit is being conducted throughNuvolaic Embedded and patient was informed this is a secure, HIPAA-complaint platform  She agrees to proceed  My office door was closed  No one else was in the room  She acknowledged consent and understanding of privacy and security of the video platform  The patient has agreed to participate and understands they can discontinue the visit at any time  Patient is aware this is a billable service  Subjective  Eriberto Miramontes is a 58 y o  female     D: Clinician met with Swati Rodney via teleheatl for individual therapy  Swati Stevens discussed worry and guilt she has been experiencing over her daughters current situation  Clinician explored feelings and normalized feelings along with challenged thoughts and feelings   Clinician discussed Swati Stevens doing what is best for her and she reports that she feels worst when she is regularly talking to her daughter and finding out that she is continuing to make bad choices for herself  Clinician explored firmer boundaries with daughter and explaining to her how she is feeling before decreasing contact  Calvin Malloy is open to this and feels it would be beneficial   A: Calvin Malloy was open and engaged in the session but reports increase in worry and overall obsessive thoughts  She was able to identify distractive coping skills to assist with anxiety  P: Continue to meet with Calvin Malloy weekly for individual therapy  HPI     No past medical history on file  No past surgical history on file  No current outpatient medications on file  No current facility-administered medications for this visit  Allergies   Allergen Reactions    Lithium Dizziness and Headache    Depakote [Valproic Acid] Rash    Geodon [Ziprasidone] Rash    Lamictal [Lamotrigine] Rash       Review of Systems    Video Exam    There were no vitals filed for this visit  Physical Exam     I spent 45 minutes directly with the patient during this visit    VIRTUAL VISIT DISCLAIMER    Ana Cantor verbally agrees to participate in [employer]  Pt is aware that [employer] could be limited without vital signs or the ability to perform a full hands-on physical Joanette Friend understands she or the provider may request at any time to terminate the video visit and request the patient to seek care or treatment in person

## 2022-04-08 ENCOUNTER — TELEMEDICINE (OUTPATIENT)
Dept: BEHAVIORAL/MENTAL HEALTH CLINIC | Facility: CLINIC | Age: 62
End: 2022-04-08
Payer: COMMERCIAL

## 2022-04-08 DIAGNOSIS — F31.32 BIPOLAR 1 DISORDER, DEPRESSED, MODERATE (HCC): Primary | ICD-10-CM

## 2022-04-08 PROCEDURE — 90834 PSYTX W PT 45 MINUTES: CPT | Performed by: COUNSELOR

## 2022-04-08 NOTE — PSYCH
Virtual Regular Visit    Verification of patient location:    Patient is located in the following state in which I hold an active license [place]      Assessment/Plan:    Problem List Items Addressed This Visit        Other    Bipolar 1 disorder, depressed, moderate ([address] ) - Primary          Goals addressed in session: Goal 1 and Goal 2          Reason for visit is No chief complaint on file  Encounter provider Jessica Miranda    Provider located at [address] [place] [id number] [phone]      Recent Visits  Date Type Provider Dept   04/01/22 310 [facility]   Showing recent visits within past 7 days and meeting all other requirements  Future Appointments  No visits were found meeting these conditions  Showing future appointments within next 150 days and meeting all other requirements       The patient was identified by name and date of birth  Diana Hanson was informed that this is a telemedicine visit and that the visit is being conducted throughDynadecic Embedded and patient was informed this is a secure, HIPAA-complaint platform  She agrees to proceed  My office door was closed  No one else was in the room  She acknowledged consent and understanding of privacy and security of the video platform  The patient has agreed to participate and understands they can discontinue the visit at any time  Patient is aware this is a billable service  Subjective  Diana Hanson is a 58 y o  female     D: Clinician met with Hemalatha Guerrero via telehealth for individual therapy  Hemalatha Yolanda discussed boundary she was able to set with her daughter by not reaching out and allowing her daughter to initiate contact  She reports that this has been difficult but she has been able to do so  She discussed the benefits of this and guilt she felt by not reaching out   Clinician normalized feelings and discussed benefits of boundaries for her own mental health and importance of taking care of herself first   A: eTd Orr was open and engaged in the session  She reports desire to continue to work on stress management  P: Continue to meet with Ted Kaigracy weekly for individual therapy  HPI     No past medical history on file  No past surgical history on file  No current outpatient medications on file  No current facility-administered medications for this visit  Allergies   Allergen Reactions    Lithium Dizziness and Headache    Depakote [Valproic Acid] Rash    Geodon [Ziprasidone] Rash    Lamictal [Lamotrigine] Rash       Review of Systems    Video Exam    There were no vitals filed for this visit  Physical Exam     I spent 45 minutes directly with the patient during this visit    VIRTUAL VISIT DISCLAIMER    Alysia Calderon verbally agrees to participate in [employer]  Pt is aware that [employer] could be limited without vital signs or the ability to perform a full hands-on physical Milus Jacobs understands she or the provider may request at any time to terminate the video visit and request the patient to seek care or treatment in person

## 2022-04-15 ENCOUNTER — TELEMEDICINE (OUTPATIENT)
Dept: BEHAVIORAL/MENTAL HEALTH CLINIC | Facility: CLINIC | Age: 62
End: 2022-04-15
Payer: COMMERCIAL

## 2022-04-15 DIAGNOSIS — F31.32 BIPOLAR 1 DISORDER, DEPRESSED, MODERATE (HCC): Primary | ICD-10-CM

## 2022-04-15 PROCEDURE — 90834 PSYTX W PT 45 MINUTES: CPT | Performed by: COUNSELOR

## 2022-04-15 NOTE — PSYCH
Virtual Regular Visit    Verification of patient location:    Patient is located in the following state in which I hold an [place] license [place]      Assessment/Plan:    Problem List Items Addressed This Visit        Other    Bipolar 1 disorder, depressed, moderate ([address] ) - Primary          Goals addressed in session: Goal 1 and Goal 2          Reason for visit is No chief complaint on file  Encounter provider Mariluz Girard    Provider located at [address] [place] [id number] [phone]      Recent Visits  Date Type Provider Dept   04/08/22 310 [facility]   Showing recent visits within past 7 days and meeting all other requirements  Future Appointments  No visits were found meeting these conditions  Showing future appointments within next 150 days and meeting all other requirements       The patient was identified by name and date of birth  Mick Hunter was informed that this is a telemedicine visit and that the visit is being conducted throughEpic Embedded and patient was informed this is a secure, HIPAA-complaint platform  She agrees to proceed  My office door was closed  No one else was in the room  She acknowledged consent and understanding of privacy and security of the video platform  The patient has agreed to participate and understands they can discontinue the visit at any time  Patient is aware this is a billable service  Subjective  Mick Hunter is a 58 y o  female     D: Clinician met with Gopi Blancas via telehealth for individual therapy  Gopi Blancas discussed upcoming holiday and plans with family  She reports some disappointment with not being invited to a gathering she was invited to in the past  Clinician explored feelings and possible causes for this   Clinician discussed the possibility of reaching out to family to ask about gathering  Clinician explored cognitive distortions and being able to believe what her daughter tells about how she's feeling  Atul De Los Santos reports being concerned that she is not welcome to her family's Easter dinner although her daughter states she wants her there  A: Atul De Los Santos was open and engaged in the session  She reports taking her medication as prescribed and has been working on better time management  P: Continue to meet with Atul De Los Santos weekly for individual therapy  HPI     No past medical history on file  No past surgical history on file  No current outpatient medications on file  No current facility-administered medications for this visit  Allergies   Allergen Reactions    Lithium Dizziness and Headache    Depakote [Valproic Acid] Rash    Geodon [Ziprasidone] Rash    Lamictal [Lamotrigine] Rash       Review of Systems    Video Exam    There were no vitals filed for this visit  Physical Exam     I spent 45 minutes directly with the patient during this visit    VIRTUAL VISIT DISCLAIMER    Levartriston Mara verbally agrees to participate in [employer]  Pt is aware that [employer] could be limited without vital signs or the ability to perform a full hands-on physical Jannie Miller understands she or the provider may request at any time to terminate the video visit and request the patient to seek care or treatment in person

## 2022-04-22 ENCOUNTER — TELEMEDICINE (OUTPATIENT)
Dept: BEHAVIORAL/MENTAL HEALTH CLINIC | Facility: CLINIC | Age: 62
End: 2022-04-22
Payer: COMMERCIAL

## 2022-04-22 DIAGNOSIS — F31.32 BIPOLAR 1 DISORDER, DEPRESSED, MODERATE (HCC): Primary | ICD-10-CM

## 2022-04-22 PROCEDURE — 90834 PSYTX W PT 45 MINUTES: CPT | Performed by: COUNSELOR

## 2022-04-22 NOTE — PSYCH
Virtual Regular Visit    Verification of patient location:    Patient is located in the following state in which [place] an [place]      Assessment/Plan:    Problem List Items Addressed This Visit        Other    Bipolar 1 disorder, depressed, moderate ([address] ) - Primary          Goals addressed in session: Goal 1 and Goal 2          Reason for visit is No chief complaint on file  Encounter provider Margo Bains    Provider located at [address] [place] [id number] [phone]      Recent Visits  Date Type Provider Dept   04/15/22 310 [facility]   Showing recent visits within past 7 days and meeting all other requirements  Future Appointments  No visits were found meeting these conditions  Showing future appointments within next 150 days and meeting all other requirements       The patient was identified by name and date of birth  Miguel Zarate was informed that this is a telemedicine visit and that the visit is being conducted throughEpic Embedded and patient was informed this is a secure, HIPAA-complaint platform  She agrees to proceed  My office door was closed  No one else was in the room  She acknowledged consent and understanding of privacy and security of the video platform  The patient has agreed to participate and understands they can discontinue the visit at any time  Patient is aware this is a billable service  Subjective  Miguel Elliot is a 58 y o  female     D: Clinician met with Mary Betts via telehealth for individual therapy  Mary Betts gave update on continued use of boundaries with her daughter and feeling less guilty about not calling her every day  Clinician explored how their interactions have been when she has called and reinforces effectiveness of setting boundaries   Clinician praised and reinforced hard work it has taken to set and maintain boundaries and discussed the benefits  West allis reports that she has been feeling better mentally aside from guilt  Clinician explored guilt and reminded her of the lack of follow through from her daughter in the past to improve her own situation  A: West allis was open and engaged in the session and presented with stable mood and affect  P: Continue to meet with West allis weekly for individual therapy  HPI     No past medical history on file  No past surgical history on file  No current outpatient medications on file  No current facility-administered medications for this visit  Allergies   Allergen Reactions    Lithium Dizziness and Headache    Depakote [Valproic Acid] Rash    Geodon [Ziprasidone] Rash    Lamictal [Lamotrigine] Rash       Review of Systems    Video Exam    There were no vitals filed for this visit  Physical Exam     I spent 45 minutes directly with the patient during this visit    VIRTUAL VISIT DISCLAIMER    Marea Mellow verbally agrees to participate in [employer]  Pt is aware that [employer] could be limited without vital signs or the ability to perform a full hands-on physical Veto Moritz understands she or the provider may request at any time to terminate the video visit and request the patient to seek care or treatment in person

## 2022-04-29 ENCOUNTER — TELEMEDICINE (OUTPATIENT)
Dept: BEHAVIORAL/MENTAL HEALTH CLINIC | Facility: CLINIC | Age: 62
End: 2022-04-29
Payer: COMMERCIAL

## 2022-04-29 DIAGNOSIS — F31.32 BIPOLAR 1 DISORDER, DEPRESSED, MODERATE (HCC): Primary | ICD-10-CM

## 2022-04-29 PROCEDURE — 90834 PSYTX W PT 45 MINUTES: CPT | Performed by: COUNSELOR

## 2022-04-29 NOTE — PSYCH
Virtual Regular Visit    Verification of patient location:    Patient is located in the following state in which I [place] an [place] license [place]      Assessment/Plan:    Problem List Items Addressed This Visit        Other    Bipolar 1 disorder, depressed, moderate ([address] ) - Primary          Goals addressed in session: Goal 1 and Goal 2          Reason for visit is No chief complaint on file  Encounter provider Mesha Mattson    Provider located at [address] [place] [id number] [phone]      Recent Visits  Date Type Provider Dept   04/22/22 310 [facility]   Showing recent visits within past 7 days and meeting all other requirements  Future Appointments  No visits were found meeting these conditions  Showing future appointments within next 150 days and meeting all other requirements       The patient was identified by name and date of birth  Michelle Malcolm was informed that this is a telemedicine visit and that the visit is being conducted through[employer] Embedded and patient was informed this is a secure, HIPAA-complaint platform  She agrees to proceed     My office door was closed  No one else was in the room  She acknowledged consent and understanding of privacy and security of the video platform  The patient has agreed to participate and understands they can discontinue the visit at any time  Patient is aware this is a billable service  Subjective  Michelle Malcolm is a 58 y o  female     D: Clinician met Edis Bear via telehealth for individual therapy  Edis Bear reports recently taking her cat to the vet and finding out that she has hypothyroidism   She reports that she has been stressed with her cats increased hyper activity and that she is hopeful that with medication the behaviors will decrease and hopefully decrease tension in the home with her and daughter and son in law  Clinician explored management of the tension  She reports that she has been working to be more open with her daughter and express feelings in a healthy way  Clinician explored boundaries with daughter and continues maintaining distance and processed feelings of guilt  A: Atul Magali was open and engaged in the session and reports increased anxiety related to her daughters poor decision making and lack of follow through with her grandchildren  Atul De Los Santos reports continued efforts to get into see a neuropsychologist   P: Continue to meet with Atul De Los Santos weekly for individual therapy  HPI     No past medical history on file  No past surgical history on file  No current outpatient medications on file  No current facility-administered medications for this visit  Allergies   Allergen Reactions    Lithium Dizziness and Headache    Depakote [Valproic Acid] Rash    Geodon [Ziprasidone] Rash    Lamictal [Lamotrigine] Rash       Review of Systems    Video Exam    There were no vitals filed for this visit  Physical Exam     I spent 45 minutes directly with the patient during this visit    VIRTUAL VISIT DISCLAIMER    Jan Terry verbally agrees to participate in [facility]  Pt is aware that [facility] could be limited without vital signs or the ability to perform a full hands-on physical Jannie Miller understands she or the provider may request at any time to terminate the video visit and request the patient to seek care or treatment in person

## 2022-05-06 ENCOUNTER — TELEMEDICINE (OUTPATIENT)
Dept: BEHAVIORAL/MENTAL HEALTH CLINIC | Facility: CLINIC | Age: 62
End: 2022-05-06
Payer: COMMERCIAL

## 2022-05-06 DIAGNOSIS — F31.32 BIPOLAR 1 DISORDER, DEPRESSED, MODERATE (HCC): Primary | ICD-10-CM

## 2022-05-06 PROCEDURE — 90834 PSYTX W PT 45 MINUTES: CPT | Performed by: COUNSELOR

## 2022-05-06 NOTE — PSYCH
Virtual Regular Visit    Verification of patient location:    Patient is located in the following state in which I hold an [place] license [place]      Assessment/Plan:    Problem List Items Addressed This Visit        Other    Bipolar 1 disorder, depressed, moderate [address] [address] ) - Primary          Goals addressed in session: Goal 1 and Goal 2          Reason for visit is No chief complaint on file  Encounter provider Duran Contreras    Provider located at [address] [place] [id number] [phone]      Recent Visits  Date Type Provider Dept   04/29/22 310 [facility]   Showing recent visits within past 7 days and meeting all other requirements  Future Appointments  No visits were found meeting these conditions  Showing future appointments within next 150 days and meeting all other requirements       The patient was identified by name and date of birth  Rin Lima was informed that this is a telemedicine visit and that the visit is being conducted throughTelephone  It was my intent to perform this visit via video technology but the patient was not able to do a video connection so the visit was completed via audio telephone only  My office door was closed  No one else was in the room  She acknowledged consent and understanding of privacy and security of the video platform  The patient has agreed to participate and understands they can discontinue the visit at any time  Patient is aware this is a billable service  Subjective  Rin Lima is a 58 y o  female     D: Clinician met with Burnie Lennox via telehealth for individual therapy  Burnie Lennox processed through continued follow through with distancing herself from her daughter and maintaining boundaries  She reports that she has been able to refrain from calling her daughter during the week   Clinician praised and encouraged this and processed how she has been feeling about limited contact  She reports continued guilt related to not being able to help her  Clinician discussed ways in which she can help her daughter in a way that is no emotionally straining for herself  A: Harsha Rucker was open and engaged in the session  She reports recent MM appointment with  at [facility] and states they plan to monitor medication as she feels she has been experiencing some manic symptoms  P: Continue to meet with Harsha Rucker weekly for individual therapy  HPI     No past medical history on file  No past surgical history on file  No current outpatient medications on file  No current facility-administered medications for this visit  Allergies   Allergen Reactions    Lithium Dizziness and Headache    Depakote [Valproic Acid] Rash    Geodon [Ziprasidone] Rash    Lamictal [Lamotrigine] Rash       Review of Systems    Video Exam    There were no vitals filed for this visit  Physical Exam     I spent 45 minutes directly with the patient during this visit    VIRTUAL VISIT DISCLAIMER    Sangita Double verbally agrees to participate in [employer]  Pt is aware that [employer] could be limited without vital signs or the ability to perform a full hands-on physical Evelia Penn understands she or the provider may request at any time to terminate the video visit and request the patient to seek care or treatment in person

## 2022-05-13 ENCOUNTER — TELEMEDICINE (OUTPATIENT)
Dept: BEHAVIORAL/MENTAL HEALTH CLINIC | Facility: CLINIC | Age: 62
End: 2022-05-13
Payer: COMMERCIAL

## 2022-05-13 DIAGNOSIS — F31.32 BIPOLAR 1 DISORDER, DEPRESSED, MODERATE (HCC): Primary | ICD-10-CM

## 2022-05-13 PROCEDURE — 90834 PSYTX W PT 45 MINUTES: CPT | Performed by: COUNSELOR

## 2022-05-13 NOTE — PSYCH
Virtual Regular Visit    Verification of patient location:    Patient is located in the following state in which I hold an [place] license [place]      Assessment/Plan:    Problem List Items Addressed This Visit        Other    Bipolar 1 disorder, depressed, moderate ([address] ) - Primary          Goals addressed in session: Goal 1 and Goal 2          Reason for visit is No chief complaint on file  Encounter provider Piotr Alavrado    Provider located at [address] [place] [id number] [phone]      Recent Visits  Date Type Provider Dept   05/06/22 310 [facility]   Showing recent visits within past 7 days and meeting all other requirements  Future Appointments  No visits were found meeting these conditions  Showing future appointments within next 150 days and meeting all other requirements       The patient was identified by name and date of birth  Tonia Brennan was informed that this is a telemedicine visit and that the visit is being conducted throughEpic Embedded and patient was informed this is a secure, HIPAA-complaint platform  She agrees to proceed     My office door was closed  No one else was in the room  She acknowledged consent and understanding of privacy and security of the video platform  The patient has agreed to participate and understands they can discontinue the visit at any time  Patient is aware this is a billable service  Subjective  Tonia Brennan is a 58 y o  female     D: Clinician met with Melania Washington via telehealth for individual therapy  Melania Padmini discussed increase in communication with her daughter that she is living with and feeling like they are more on the same page  Clinician explored this and benefits of regular communication about feelings   She reports her daughter was able to express her concerns in a healthy way and come up with ways to address those concerns  Clinician explored Katherine's past cognitive distortion and ways to utilize positive self talk and gaining reassurance as ways to manage those thoughts  Calvin Malloy reports an increase in coping skills now that they weather is nicer and reports she has been getting outside to walk and watch her grandchildren play baseball  A: Calvin Malloy was open and engaged in the session  She reports feeling more stable and believes she may be out of manic states  She reports follow up with her doctor and taking her medication as prescribed  P: Continue to meet with Calvin Malloy weekly for individual therapy  HPI     No past medical history on file  No past surgical history on file  No current outpatient medications on file  No current facility-administered medications for this visit  Allergies   Allergen Reactions    Lithium Dizziness and Headache    Depakote [Valproic Acid] Rash    Geodon [Ziprasidone] Rash    Lamictal [Lamotrigine] Rash       Review of Systems    Video Exam    There were no vitals filed for this visit  Physical Exam     I spent 45 minutes directly with the patient during this visit    VIRTUAL VISIT DISCLAIMER    Ana Cantor verbally agrees to participate in [employer]  Pt is aware that [employer] could be limited without vital signs or the ability to perform a full hands-on physical Marlenianette Friend understands she or the provider may request at any time to terminate the video visit and request the patient to seek care or treatment in person

## 2022-05-20 ENCOUNTER — TELEMEDICINE (OUTPATIENT)
Dept: BEHAVIORAL/MENTAL HEALTH CLINIC | Facility: CLINIC | Age: 62
End: 2022-05-20
Payer: COMMERCIAL

## 2022-05-20 DIAGNOSIS — F31.32 BIPOLAR 1 DISORDER, DEPRESSED, MODERATE (HCC): Primary | ICD-10-CM

## 2022-05-20 PROCEDURE — 90834 PSYTX W PT 45 MINUTES: CPT | Performed by: COUNSELOR

## 2022-05-20 NOTE — PSYCH
Virtual Regular Visit    Verification of patient location:    Patient is located in the following state in which I hold an active license [place]      Assessment/Plan:    Problem List Items Addressed This Visit        Other    Bipolar 1 disorder, depressed, moderate ([address] ) - Primary          Goals addressed in session: Goal 1 and Goal 2          Reason for visit is No chief complaint on file  Encounter provider Jessica Miranda    Provider located at [address] [place] [id number] [phone]      Recent Visits  Date Type Provider Dept   05/13/22 29[address] [facility] Therapist Luba Tolbert   Showing recent visits within past 7 days and meeting all other requirements  Future Appointments  No visits were found meeting these conditions  Showing future appointments within next 150 days and meeting all other requirements       The patient was identified by name and date of birth  Diana Hanson was informed that this is a telemedicine visit and that the visit is being conducted throughEpic Embedded and patient was informed this is a secure, HIPAA-complaint platform  She agrees to proceed     My office door was closed  No one else was in the room  She acknowledged consent and understanding of privacy and security of the video platform  The patient has agreed to participate and understands they can discontinue the visit at any time  Patient is aware this is a billable service  Subjective  Diana Hanson is a 58 y o  female     D: Clinician met with Hemalatha Guerrero via telehealth for individual therapy  Hemalatha Guerrero processed stress related to continued difficulties with her cat and beginning the process of surrendering the cat  She reports a lot of sadness and guilt about situation  Clinician explored past attempts to resolve issues and consulting a vet   Clinician validated several attempts to resolve issue in the past but importance of working things out with her family  Shady Hawkins reports talking to her daughter in order to do the best thing for the household  Clinician praised and reinforced use of healthy communication and problem solving skills  Clinician explored follow through with boundaries with adult daughter and stress management  A: Shady Hawkins was open and engaged in the session and presented with stable mood and affect  P: Continue to meet with Shady Hawkins weekly for individual therapy  HPI     No past medical history on file  No past surgical history on file  No current outpatient medications on file  No current facility-administered medications for this visit  Allergies   Allergen Reactions    Lithium Dizziness and Headache    Depakote [Valproic Acid] Rash    Geodon [Ziprasidone] Rash    Lamictal [Lamotrigine] Rash       Review of Systems    Video Exam    There were no vitals filed for this visit  Physical Exam     I spent 45 minutes directly with the patient during this visit    VIRTUAL VISIT DISCLAIMER    Tammi Cleveland verbally agrees to participate in [employer]  Pt is aware that [employer] could be limited without vital signs or the ability to perform a full hands-on physical Ofilia Loss understands she or the provider may request at any time to terminate the video visit and request the patient to seek care or treatment in person

## 2022-05-27 ENCOUNTER — TELEPHONE (OUTPATIENT)
Dept: BEHAVIORAL/MENTAL HEALTH CLINIC | Facility: CLINIC | Age: 62
End: 2022-05-27

## 2022-05-27 ENCOUNTER — TELEMEDICINE (OUTPATIENT)
Dept: BEHAVIORAL/MENTAL HEALTH CLINIC | Facility: CLINIC | Age: 62
End: 2022-05-27
Payer: COMMERCIAL

## 2022-05-27 DIAGNOSIS — F31.32 BIPOLAR 1 DISORDER, DEPRESSED, MODERATE (HCC): Primary | ICD-10-CM

## 2022-05-27 PROCEDURE — 90834 PSYTX W PT 45 MINUTES: CPT | Performed by: COUNSELOR

## 2022-05-27 NOTE — TELEPHONE ENCOUNTER
Hi, patient called and just want the provider to know that she is having hard time getting the link in [address] [place] [address]  She doesn't know why  Writer told patient that  it might have something to do with the signal in [address] [place] [address] itself  Writer also told patient that if it happens in the future just give our office a call

## 2022-05-27 NOTE — PSYCH
Virtual Regular Visit    Verification of patient location:    Patient is located in the following state in which I hold an active license [place]      Assessment/Plan:    Problem List Items Addressed This Visit        Other    Bipolar 1 disorder, depressed, moderate ([address] ) - Primary          Goals addressed in session: Goal 1 and Goal 2          Reason for visit is No chief complaint on file  Encounter provider Mariluz Girard    Provider located at [address] [place] [id number] [phone]      Recent Visits  Date Type Provider Dept   05/20/22 310 [facility]   Showing recent visits within past 7 days and meeting all other requirements  Future Appointments  No visits were found meeting these conditions  Showing future appointments within next 150 days and meeting all other requirements       The patient was identified by name and date of birth  Mick Hunter was informed that this is a telemedicine visit and that the visit is being conducted through Telephone  It was my intent to perform this visit via video technology but the patient was not able to do a video connection so the visit was completed via audio telephone only  She agrees to proceed  My office door was closed  No one else was in the room  She acknowledged consent and understanding of privacy and security of the video platform  The patient has agreed to participate and understands they can discontinue the visit at any time  Patient is aware this is a billable service  Subjective  Mick Hunter is a 58 y o  female     D: Clinician met with Gopi Blancas via telehealth for individual therapy  Gopi Blancas reports that her and her family have had COVID for the last week and that she is trying to recover   She reports feeling guilty about not being able to help her daughter and grandchildren as she tries to rest  Clinician reminded Zak Barrios of importance of taking the time to rest and recover in order to be able to get back to caring for her family  Clinician validated and normalized frustration with current situation and reminded her of the temporary nature of the stress  Clinician checked in with her boundaries with her daughter and process made with maintaining those boundaries  Zak Barrios discussed decision to put her cat down this week as well due to continued behavioral and health concerns and stress within the family  A: Zak Barrios was open and engaged in the session  She reports taking medication as prescribed  P: Continue to meet with Zak Barrios weekly for individual therapy  HPI     No past medical history on file  No past surgical history on file  No current outpatient medications on file  No current facility-administered medications for this visit  Allergies   Allergen Reactions    Lithium Dizziness and Headache    Depakote [Valproic Acid] Rash    Geodon [Ziprasidone] Rash    Lamictal [Lamotrigine] Rash       Review of Systems    Video Exam    There were no vitals filed for this visit  Physical Exam     I spent 45 minutes directly with the patient during this visit    VIRTUAL VISIT DISCLAIMER    Kim Salazar verbally agrees to participate in [employer]  Pt is aware that [employer] could be limited without vital signs or the ability to perform a full hands-on physical Eusebia Guerrero understands she or the provider may request at any time to terminate the video visit and request the patient to seek care or treatment in person

## 2022-06-03 ENCOUNTER — TELEMEDICINE (OUTPATIENT)
Dept: BEHAVIORAL/MENTAL HEALTH CLINIC | Facility: CLINIC | Age: 62
End: 2022-06-03
Payer: COMMERCIAL

## 2022-06-03 DIAGNOSIS — F31.32 BIPOLAR 1 DISORDER, DEPRESSED, MODERATE (HCC): Primary | ICD-10-CM

## 2022-06-03 PROCEDURE — 90834 PSYTX W PT 45 MINUTES: CPT | Performed by: COUNSELOR

## 2022-06-03 NOTE — PSYCH
Virtual Regular Visit    Verification of patient location:    Patient is located in the following state in which I hold an active license [place]      Assessment/Plan:    Problem List Items Addressed This Visit        Other    Bipolar 1 disorder, depressed, moderate ([address] ) - Primary          Goals addressed in session: Goal 1 and Goal 2          Reason for visit is No chief complaint on file  Encounter provider Jordan Landaverde    Provider located at [address] [place] [id number] [phone]      Recent Visits  Date Type Provider Dept   05/27/22 Telephone [facility]   05/27/22 Telemedicine Jordan Landaverde [facility] Therapist Ned   Showing recent visits within past 7 days and meeting all other requirements  Future Appointments  No visits were found meeting these conditions  Showing future appointments within next 150 days and meeting all other requirements       The patient was identified by name and date of birth  Giselle Musa was informed that this is a telemedicine visit and that the visit is being conducted throughEpic Embedded and patient was informed this is a secure, HIPAA-complaint platform  She agrees to proceed     My office door was closed  No one else was in the room  She acknowledged consent and understanding of privacy and security of the video platform  The patient has agreed to participate and understands they can discontinue the visit at any time  Patient is aware this is a billable service  Subjective  Giselle Musa is a 58 y o  female     D: Clinician met with Rupesh Sheridan via telehealth for individual therapy  Rupesh Sheridan discussed continued recovery from COVID and fatigued and tiredness  She reports grief related to the passing of her cat and struggling to manage symptoms   She reports sadness and easily distracted over the last week along with thinking about her cat often  Clinician normalized grief related symptoms and stages of grief along with possible ways to cope with emotions  A: Kim Coker was open and engaged in the session  She reports taking her medication as prescribed and feels she has been more stable lately with a decrease in manic symptoms  P: Continue to meet with Kim Coker weekly for individual therapy  HPI     No past medical history on file  No past surgical history on file  No current outpatient medications on file  No current facility-administered medications for this visit  Allergies   Allergen Reactions    Lithium Dizziness and Headache    Depakote [Valproic Acid] Rash    Geodon [Ziprasidone] Rash    Lamictal [Lamotrigine] Rash       Review of Systems    Video Exam    There were no vitals filed for this visit  Physical Exam     I spent 45 minutes directly with the patient during this visit    VIRTUAL VISIT DISCLAIMER    Yamil Tucker verbally agrees to participate in [employer]  Pt is aware that [employer] could be limited without vital signs or the ability to perform a full hands-on physical Loli Vincent understands she or the provider may request at any time to terminate the video visit and request the patient to seek care or treatment in person

## 2022-06-10 ENCOUNTER — TELEMEDICINE (OUTPATIENT)
Dept: BEHAVIORAL/MENTAL HEALTH CLINIC | Facility: CLINIC | Age: 62
End: 2022-06-10
Payer: COMMERCIAL

## 2022-06-10 DIAGNOSIS — F31.32 BIPOLAR 1 DISORDER, DEPRESSED, MODERATE (HCC): Primary | ICD-10-CM

## 2022-06-10 PROCEDURE — 90834 PSYTX W PT 45 MINUTES: CPT | Performed by: COUNSELOR

## 2022-06-10 NOTE — PSYCH
Virtual Regular Visit    Verification of patient location:    Patient is located in the following state in which I hold an [place] license [place]      Assessment/Plan:    Problem List Items Addressed This Visit        Other    Bipolar 1 disorder, depressed, moderate ([address] ) - Primary          Goals addressed in session: Goal 1 and Goal 2          Reason for visit is No chief complaint on file  Encounter provider Marva Rodney    Provider located at [address] [place] [id number] [phone]      Recent Visits  Date Type Provider Dept   06/03/22 310 [facility]   Showing recent visits within past 7 days and meeting all other requirements  Future Appointments  No visits were found meeting these conditions  Showing future appointments within next 150 days and meeting all other requirements       The patient was identified by name and date of birth  Snow Page was informed that this is a telemedicine visit and that the visit is being conducted throughEpic Embedded and patient was informed this is a secure, HIPAA-complaint platform  She agrees to proceed  My office door was closed  No one else was in the room  She acknowledged consent and understanding of privacy and security of the video platform  The patient has agreed to participate and understands they can discontinue the visit at any time  Patient is aware this is a billable service  Subjective  Snow Page is a 58 y o  female     D: Clinician met with Grace Gonsalez via telehealth for individual therapy  Grace Gonsalez reports that her brother's health has begun to decline and she is concerned that he may pass in the near future  Clinician normalized feelings and provided supportive counseling  Clinician explored use of social support and coping skills   She reports that she has been journaling more and feels this has been helpful  She also discussed gaining support from her sister  Clinician discussed grief related symptoms and normalized an increase in symptoms due to recent grief she has experienced and being triggered by recent health issues  A: Kareen Milian presented with depressed mood  She reports long term covid fatigue as well as increase in depressive symptoms and grief  She reports taking medication as prescribed  P: Continue to meet with Kareen Milian weekly for individual therapy  HPI     No past medical history on file  No past surgical history on file  No current outpatient medications on file  No current facility-administered medications for this visit  Allergies   Allergen Reactions    Lithium Dizziness and Headache    Depakote [Valproic Acid] Rash    Geodon [Ziprasidone] Rash    Lamictal [Lamotrigine] Rash       Review of Systems    Video Exam    There were no vitals filed for this visit  Physical Exam     I spent 45 minutes directly with the patient during this visit    VIRTUAL VISIT DISCLAIMER    Taran Martinez verbally agrees to participate in [employer]  Pt is aware that [employer] could be limited without vital signs or the ability to perform a full hands-on physical Arlen Randolph understands she or the provider may request at any time to terminate the video visit and request the patient to seek care or treatment in person

## 2022-06-17 ENCOUNTER — TELEMEDICINE (OUTPATIENT)
Dept: BEHAVIORAL/MENTAL HEALTH CLINIC | Facility: CLINIC | Age: 62
End: 2022-06-17
Payer: COMMERCIAL

## 2022-06-17 DIAGNOSIS — F31.32 BIPOLAR 1 DISORDER, DEPRESSED, MODERATE (HCC): Primary | ICD-10-CM

## 2022-06-17 PROCEDURE — 90834 PSYTX W PT 45 MINUTES: CPT | Performed by: COUNSELOR

## 2022-06-17 NOTE — PSYCH
Virtual Regular Visit    Verification of patient location:    Patient is located in the following state in which I hold an [place] license [place]      Assessment/Plan:    Problem List Items Addressed This Visit        Other    Bipolar 1 disorder, depressed, moderate ([address] ) - Primary          Goals addressed in session: Goal 1 and Goal 2          Reason for visit is No chief complaint on file  Encounter provider Mariluz Girard    Provider located at [address] [place] [phone]      Recent Visits  Date Type Provider Dept   06/10/22 310 [facility]   Showing recent visits within past 7 days and meeting all other requirements  Future Appointments  No visits were found meeting these conditions  Showing future appointments within next 150 days and meeting all other requirements       The patient was identified by name and date of birth  Mick Hunter was informed that this is a telemedicine visit and that the visit is being conducted throughEpic Embedded and patient was informed this is a secure, HIPAA-complaint platform  She agrees to proceed     My office door was closed  No one else was in the room  She acknowledged consent and understanding of privacy and security of the video platform  The patient has agreed to participate and understands they can discontinue the visit at any time  Patient is aware this is a billable service  Subjective  Mick Hunter is a 58 y o  female     D: Clinician met with Gopi Blancas via telehealth for individual therapy  Gopi Blancas processed attempts at helping out around the house and managing dynamic in her home  Clinician explored communication with daughter and working to be more open about how she is feeling   She processed recent conversation with daughter and how she felt after when she was able to express herself and get more detail information  Clinician gave feedback on assertive communication of needs and importance of expressing her needs to daughter to work on relationship  A: Harsh joel was open and engaged in the session  She reports increase in depressive symptoms  She reports sleeping more lately and clinician explored possibility of depressive episode  She has an upcoming MM appointment  P: Continue to meet with [place] weekly for individual therapy  HPI     No past medical history on file  No past surgical history on file  No current outpatient medications on file  No current facility-administered medications for this visit  Allergies   Allergen Reactions    Lithium Dizziness and Headache    Depakote [Valproic Acid] Rash    Geodon [Ziprasidone] Rash    Lamictal [Lamotrigine] Rash       Review of Systems    Video Exam    There were no vitals filed for this visit  Physical Exam     I spent 50 minutes directly with the patient during this visit    VIRTUAL VISIT DISCLAIMER    Christopher Euceda verbally agrees to participate in [employer]  Pt is aware that [employer] could be limited without vital signs or the ability to perform a full hands-on physical Rice Ulises understands she or the provider may request at any time to terminate the video visit and request the patient to seek care or treatment in person

## 2022-06-24 ENCOUNTER — TELEMEDICINE (OUTPATIENT)
Dept: BEHAVIORAL/MENTAL HEALTH CLINIC | Facility: CLINIC | Age: 62
End: 2022-06-24
Payer: COMMERCIAL

## 2022-06-24 DIAGNOSIS — F31.32 BIPOLAR 1 DISORDER, DEPRESSED, MODERATE (HCC): Primary | ICD-10-CM

## 2022-06-24 PROCEDURE — 90834 PSYTX W PT 45 MINUTES: CPT | Performed by: COUNSELOR

## 2022-06-24 NOTE — PSYCH
Virtual Regular Visit    Verification of patient location:    Patient is located in the following state in which I [place] an [place] license [place]      Assessment/Plan:    Problem List Items Addressed This Visit        Other    Bipolar 1 disorder, depressed, moderate ([address] ) - Primary          Goals addressed in session: Goal 1 and Goal 2          Reason for visit is No chief complaint on file  Encounter provider Dudley Boudreaux    Provider located at [address] [place] [id number] [phone]      Recent Visits  Date Type Provider Dept   06/17/22 310 [facility]   Showing recent visits within past 7 days and meeting all other requirements  Future Appointments  No visits were found meeting these conditions  Showing future appointments within next 150 days and meeting all other requirements       The patient was identified by name and date of birth  Tai Sebastian was informed that this is a telemedicine visit and that the visit is being conducted throughEpic Embedded and patient was informed this is a secure, HIPAA-complaint platform  She agrees to proceed     My office door was closed  No one else was in the room  She acknowledged consent and understanding of privacy and security of the video platform  The patient has agreed to participate and understands they can discontinue the visit at any time  Patient is aware this is a billable service  Subjective  Tai Sebastian is a 58 y o  female     D: Clinician met with Naina Saunders via telehealth for individual therapy  Naina Saunders processed recent weekend where she spent time with family and her grandsons birthday party  She reports that her family was able to get together and be civil for the party but no resolution was made between siblings   She reports desire for family to work things out but understands that her children must work things out on their own time  Clinician normalized frustration and hurt feelings about situation  Clinician worked through ways to communicate with her daughter about recent incident where she felt she was taken advantage of  Clinician role played to work through potential way to address daughter with assertive communication  A: Kim Coker was open and engaged in the session  She reports taking her medication as prescribed  She reports continued depressive symptoms especially decreased energy which she reports attempts at more exercise and coping skills to combat symptoms  P: Continue to meet with Kim Coker weekly for individual therapy  HPI     No past medical history on file  No past surgical history on file  No current outpatient medications on file  No current facility-administered medications for this visit  Allergies   Allergen Reactions    Lithium Dizziness and Headache    Depakote [Valproic Acid] Rash    Geodon [Ziprasidone] Rash    Lamictal [Lamotrigine] Rash       Review of Systems    Video Exam    There were no vitals filed for this visit  Physical Exam     I spent 45 minutes directly with the patient during this visit    VIRTUAL VISIT DISCLAIMER    Yamil Tucker verbally agrees to participate in [employer]  Pt is aware that [employer] could be limited without vital signs or the ability to perform a full hands-on physical Loli Vincent understands she or the provider may request at any time to terminate the video visit and request the patient to seek care or treatment in person

## 2022-07-01 ENCOUNTER — TELEMEDICINE (OUTPATIENT)
Dept: BEHAVIORAL/MENTAL HEALTH CLINIC | Facility: CLINIC | Age: 62
End: 2022-07-01
Payer: COMMERCIAL

## 2022-07-01 DIAGNOSIS — F31.32 BIPOLAR 1 DISORDER, DEPRESSED, MODERATE (HCC): Primary | ICD-10-CM

## 2022-07-01 PROCEDURE — 90834 PSYTX W PT 45 MINUTES: CPT | Performed by: COUNSELOR

## 2022-07-01 NOTE — PSYCH
Virtual Regular Visit    Verification of patient location:    Patient is located in the following state in which I hold an active license [place]      Assessment/Plan:    Problem List Items Addressed This Visit        Other    Bipolar 1 disorder, depressed, moderate ([address] ) - Primary          Goals addressed in session: Goal 1 and Goal 2          Reason for visit is No chief complaint on file  Encounter provider Garry Walker    Provider located at [address] [place] [id number] [phone]      Recent Visits  Date Type Provider Dept   06/24/22 310 [facility]   Showing recent visits within past 7 days and meeting all other requirements  Future Appointments  No visits were found meeting these conditions  Showing future appointments within next 150 days and meeting all other requirements       The patient was identified by name and date of birth  Roxann Blanchard was informed that this is a telemedicine visit and that the visit is being conducted throughEpic Embedded and patient was informed this is a secure, HIPAA-complaint platform  She agrees to proceed     My office door was closed  No one else was in the room  She acknowledged consent and understanding of privacy and security of the video platform  The patient has agreed to participate and understands they can discontinue the visit at any time  Patient is aware this is a billable service  Subjective  Roxann Blanchard is a 58 y o  female     D: Clinician met with Angelita Schaefer via telehealth for individual therapy  Angelita Olive reports recently meeting with her doctor to discuss her medication and their decision to take her off of latuda  She reports that she was having symptoms of tardive dyskinesia and her doctor wanted to discontinued medication due to this   Angelita Schaefer reports some concerns related to this decision as she is fearful of managing her mood but is open to trying and reports understanding that she should reach out if she has any issues  Jonathan Hernandez discussed making small lists daily and working on completing a few tasks and a benefit in mood with this along with witting things down consistently to help with memory concerns  Clinician validated and encouraged this  Jonathan Hernandez discussed upcoming weekly plans to spend time with family  A: Jonathan Hernandez was open and engaged in the session and reports taking her medication as prescribed  P: Continue to meet with Jonathan Hernandez weekly for individual therapy  HPI     No past medical history on file  No past surgical history on file  No current outpatient medications on file  No current facility-administered medications for this visit  Allergies   Allergen Reactions    Lithium Dizziness and Headache    Depakote [Valproic Acid] Rash    Geodon [Ziprasidone] Rash    Lamictal [Lamotrigine] Rash       Review of Systems    Video Exam    There were no vitals filed for this visit  Physical Exam     I spent 37 minutes directly with the patient during this visit    VIRTUAL VISIT DISCLAIMER    Lynda Kenny verbally agrees to participate in [employer]  Pt is aware that [employer] could be limited without vital signs or the ability to perform a full hands-on physical Winifred Dumas understands she or the provider may request at any time to terminate the video visit and request the patient to seek care or treatment in person

## 2022-07-08 ENCOUNTER — TELEMEDICINE (OUTPATIENT)
Dept: BEHAVIORAL/MENTAL HEALTH CLINIC | Facility: CLINIC | Age: 62
End: 2022-07-08
Payer: COMMERCIAL

## 2022-07-08 DIAGNOSIS — F31.32 BIPOLAR 1 DISORDER, DEPRESSED, MODERATE (HCC): Primary | ICD-10-CM

## 2022-07-08 PROCEDURE — 90834 PSYTX W PT 45 MINUTES: CPT | Performed by: COUNSELOR

## 2022-07-08 NOTE — PSYCH
Virtual Regular Visit    Verification of patient location:    Patient is located in the following state in which I hold an active license [place]      Assessment/Plan:    Problem List Items Addressed This Visit        Other    Bipolar 1 disorder, depressed, moderate ([address] ) - Primary          Goals addressed in session: Goal 1 and Goal 2          Reason for visit is No chief complaint on file  Encounter provider Asad Angel    Provider located at [address] [place] [id number] [phone]      Recent Visits  Date Type Provider Dept   07/01/22 310 [facility]   Showing recent visits within past 7 days and meeting all other requirements  Future Appointments  No visits were found meeting these conditions  Showing future appointments within next 150 days and meeting all other requirements       The patient was identified by name and date of birth  Rossy Cook was informed that this is a telemedicine visit and that the visit is being conducted throughEpic Embedded and patient was informed this is a secure, HIPAA-complaint platform  She agrees to proceed     My office door was closed  No one else was in the room  She acknowledged consent and understanding of privacy and security of the video platform  The patient has agreed to participate and understands they can discontinue the visit at any time  Patient is aware this is a billable service  Subjective  Rossy Cook is a 58 y o  female     D: Clinician met with Ozzy Hardin via telehealth for individual therapy  Ozzy Hardin processed recent side effects as she has been weaning off of [place]  She reports feeling more 'spacey' and struggling to focus on tasks  Clinician advised Ozzy Hardin to keep track of symptoms and side effects and address them with her doctor at their next MM appointment next week   She discussed interaction with daughter and working on maintaining boundaries  Clinician role played possible ways to communicate her anger about recent situation and feeling like she was taken advantage of   A: Fabiola Tijerina was open and engaged in the session  She reports taking her medication  P: Continue to meet with Fabiola Tijerina weekly for individual therapy  HPI     No past medical history on file  No past surgical history on file  No current outpatient medications on file  No current facility-administered medications for this visit  Allergies   Allergen Reactions    Lithium Dizziness and Headache    Depakote [Valproic Acid] Rash    Geodon [Ziprasidone] Rash    Lamictal [Lamotrigine] Rash       Review of Systems    Video Exam    There were no vitals filed for this visit  Physical Exam     I spent 45 minutes directly with the patient during this visit    VIRTUAL VISIT DISCLAIMER    Perez Kid verbally agrees to participate in [employer]  Pt is aware that [employer] could be limited without vital signs or the ability to perform a full hands-on physical Toño Singletary understands she or the provider may request at any time to terminate the video visit and request the patient to seek care or treatment in person

## 2022-07-15 ENCOUNTER — TELEMEDICINE (OUTPATIENT)
Dept: BEHAVIORAL/MENTAL HEALTH CLINIC | Facility: CLINIC | Age: 62
End: 2022-07-15
Payer: COMMERCIAL

## 2022-07-15 DIAGNOSIS — F31.32 BIPOLAR 1 DISORDER, DEPRESSED, MODERATE (HCC): Primary | ICD-10-CM

## 2022-07-15 PROCEDURE — 90834 PSYTX W PT 45 MINUTES: CPT | Performed by: COUNSELOR

## 2022-07-15 NOTE — PSYCH
Virtual Regular Visit    Verification of patient location:    Patient is located in the following state in which I hold an active license [place]      Assessment/Plan:    Problem List Items Addressed This Visit        Other    Bipolar 1 disorder, depressed, moderate ([address] ) - Primary          Goals addressed in session: Goal 1 and Goal 2          Reason for visit is No chief complaint on file  Encounter provider Renato Iqbal    Provider located at [address] [place] [id number] [phone]      Recent Visits  Date Type Provider Dept   07/08/22 310 [facility]   Showing recent visits within past 7 days and meeting all other requirements  Future Appointments  No visits were found meeting these conditions  Showing future appointments within next 150 days and meeting all other requirements       The patient was identified by name and date of birth  Emi Hyde was informed that this is a telemedicine visit and that the visit is being conducted throughEpic Embedded and patient was informed this is a secure, HIPAA-complaint platform  She agrees to proceed  My office door was closed  No one else was in the room  She acknowledged consent and understanding of privacy and security of the video platform  The patient has agreed to participate and understands they can discontinue the visit at any time  Patient is aware this is a billable service  Subjective  Emi Hyde is a 58 y o  female     D: Clinician met with Thomas Garcia via telehealth for individual therapy  Thomas Garcia discussed recent change in medication and concerns related to past side effects  She reports continued fatigue, lack of energy and overall tiredness that she feels is not being improved by quality of sleep   Clinician explored possible causes for symptoms and processed lasting symptoms of COVID  Clinician explored ways to reach out to physicians if need for side effects and coordinating care as needed  Clinician also normalized taking time to relax and utilize self care as needed  A: Yovana Bey was open and engaged in the session  She reports frustration with lasting fatigue  She presented with stable mood and affect  P:Contiue to meet with Yovana Bey weekly for individual therapy  HPI     No past medical history on file  No past surgical history on file  No current outpatient medications on file  No current facility-administered medications for this visit  Allergies   Allergen Reactions    Lithium Dizziness and Headache    Depakote [Valproic Acid] Rash    Geodon [Ziprasidone] Rash    Lamictal [Lamotrigine] Rash       Review of Systems    Video Exam    There were no vitals filed for this visit  Physical Exam     I spent 45 minutes directly with the patient during this visit    VIRTUAL VISIT DISCLAIMER    Fatoumata Grimes verbally agrees to participate in [employer]  Pt is aware that [employer] could be limited without vital signs or the ability to perform a full hands-on physical Sung Bun understands she or the provider may request at any time to terminate the video visit and request the patient to seek care or treatment in person

## 2022-07-22 ENCOUNTER — TELEMEDICINE (OUTPATIENT)
Dept: BEHAVIORAL/MENTAL HEALTH CLINIC | Facility: CLINIC | Age: 62
End: 2022-07-22
Payer: COMMERCIAL

## 2022-07-22 DIAGNOSIS — F31.32 BIPOLAR 1 DISORDER, DEPRESSED, MODERATE (HCC): Primary | ICD-10-CM

## 2022-07-22 PROCEDURE — 90834 PSYTX W PT 45 MINUTES: CPT | Performed by: COUNSELOR

## 2022-07-22 NOTE — PSYCH
Virtual Regular Visit    Verification of patient location:    Patient is located in the following state in which I hold an active license [place]      Assessment/Plan:    Problem List Items Addressed This Visit        Other    Bipolar 1 disorder, depressed, moderate ([address] ) - Primary          Goals addressed in session: Goal 1 and Goal 2          Reason for visit is No chief complaint on file  Encounter provider Brian Beckham    Provider located at [address] [place] [id number] [phone]      Recent Visits  Date Type Provider Dept   07/15/22 310 [facility]   Showing recent visits within past 7 days and meeting all other requirements  Future Appointments  No visits were found meeting these conditions  Showing future appointments within next 150 days and meeting all other requirements       The patient was identified by name and date of birth  Fatoumata Grimes was informed that this is a telemedicine visit and that the visit is being conducted throughEpic Embedded and patient was informed this is a secure, HIPAA-complaint platform  She agrees to proceed     My office door was closed  No one else was in the room  She acknowledged consent and understanding of privacy and security of the video platform  The patient has agreed to participate and understands they can discontinue the visit at any time  Patient is aware this is a billable service  Subjective  Fatoumata Grimes is a 58 y o  female     D: Clinician met with Yovana Bey via telehealth for individual therapy  Yovana Bey processed concerns related to medication issues and struggling with the pharmacy to get the correct refills  Clinician explored possible ways to resolve concerns and communicate her needs to others   Yovana Bey processed frustration related to long term fatigue related to COVID symptoms and working on managing energy level and task completion  Clinician updated treatment plan during the session and discussed continuing to work on process grief and increasing skills to assist with memory and focus  A: Arielle Albert was open and engaged in the session and reports taking medication as prescribed  P: Continue to meet with Arielle Albert weekly for individual therapy  HPI     No past medical history on file  No past surgical history on file  No current outpatient medications on file  No current facility-administered medications for this visit  Allergies   Allergen Reactions    Lithium Dizziness and Headache    Depakote [Valproic Acid] Rash    Geodon [Ziprasidone] Rash    Lamictal [Lamotrigine] Rash       Review of Systems    Video Exam    There were no vitals filed for this visit  Physical Exam     I spent 45 minutes directly with the patient during this visit    VIRTUAL VISIT DISCLAIMER    Kyree Mayfield verbally agrees to participate in [employer]  Pt is aware that [employer] could be limited without vital signs or the ability to perform a full hands-on physical Frances Herbert understands she or the provider may request at any time to terminate the video visit and request the patient to seek care or treatment in person

## 2022-07-22 NOTE — BH TREATMENT PLAN
Misha Otero  1960         Date of Initial Treatment Plan: 8/31/21   Date of Current Treatment Plan:7/22/22     Treatment Plan Number 3     Strengths/Personal Resources for Self Care: Well establish coping skills, supportive family, caring , empathetic, compliant with medication     Diagnosis:   1  Bipolar disorder, current episode depressed, severe, without psychotic features ([facility])            Area of Needs: Healthy communication skills, coping skills, grief and loss        Long Term Goal 1: Decrease frequency, duration and intensity of depressive symptoms     Target Date:12/18/22  Completion Date: 1/18/23         Short Term Objectives for Goal 1: 1  Teach and practice assetive communication skills 2  Increase use of relaxation skills  3  Increase use of healthy coping skills and self care     Long Term Goal 2: Process grief related symptoms     Target Date: 12/18/22  Completion Date: 1/18/23     Short Term Objectives for Goal 2: 1  Complete psycho education on grief and normalize feelings  2  Provide community resources for increased support as needed        GOAL 1: Modality: Individual 4x per month   Completion Date 1/18/23 and The person(s) responsible for carrying out the plan is Donnell Price     GOAL 2: Modality:  Individual 4x per month   Completion Date 1/18/23 and The person(s) responsible for carrying out the plan is Donnell Hill, 1960, actively participated in the review and update of this treatment plan during a virtual session, using the [address] [place] [address]  Misha Shanna  provided verbal consent on 7/22/2022 at 9:40 AM  The treatment plan was transcribed into the [employer] at a later time

## 2022-07-29 ENCOUNTER — TELEMEDICINE (OUTPATIENT)
Dept: BEHAVIORAL/MENTAL HEALTH CLINIC | Facility: CLINIC | Age: 62
End: 2022-07-29
Payer: COMMERCIAL

## 2022-07-29 DIAGNOSIS — F31.32 BIPOLAR 1 DISORDER, DEPRESSED, MODERATE (HCC): Primary | ICD-10-CM

## 2022-07-29 PROCEDURE — 90834 PSYTX W PT 45 MINUTES: CPT | Performed by: COUNSELOR

## 2022-07-29 NOTE — PSYCH
Virtual Regular Visit    Verification of patient location:    Patient is located in the following state in which I hold an [place] license [place]      Assessment/Plan:    Problem List Items Addressed This Visit        Other    Bipolar 1 disorder, depressed, moderate ([facility] [address] ) - Primary          Goals addressed in session: Goal 1 and Goal 2          Reason for visit is No chief complaint on file  Encounter provider Antonieta Boss    Provider located at [address] [place] [id number] [phone]      Recent Visits  Date Type Provider Dept   07/22/22 310 [facility]   Showing recent visits within past 7 days and meeting all other requirements  Future Appointments  No visits were found meeting these conditions  Showing future appointments within next 150 days and meeting all other requirements       The patient was identified by name and date of birth  Jc Carranza was informed that this is a telemedicine visit and that the visit is being conducted throughEpic Embedded and patient was informed this is a secure, HIPAA-complaint platform  She agrees to proceed  My office door was closed  No one else was in the room  She acknowledged consent and understanding of privacy and security of the video platform  The patient has agreed to participate and understands they can discontinue the visit at any time  Patient is aware this is a billable service  Subjective  Jc Carranza is a 58 y o  female     D: Clinician met with Garett Banks via telehealth for individual therapy  Garett Banks processed most recent MM appointment with her doctor and their decision to keep her medcation the same rather than add a new medication for anxiety   She reports that she was able to advocate for herself and address her concerns about the medication and that her doctor was alright with keep her regime the same for the time being  Clinician praised and encouraged healthy communication and expression of needs and feelings  Lesia Shepard reports that she continues to manage depressive symptoms daily which she states lack of motivation and energy being the most significant  She states that she utilizes coping skills regular and has continues to journal and feels this has been helpful in tracking and expressing feelings  Clinician explored and validated journaling as a healthy habit  A: Lesia Shepard was open and engage din the session and reports overall stable mood  She reports taking her medication as prescribed  P: Continue to meet with Lesia Shepard weekly for individual therapy  HPI     No past medical history on file  No past surgical history on file  No current outpatient medications on file  No current facility-administered medications for this visit  Allergies   Allergen Reactions    Lithium Dizziness and Headache    Depakote [Valproic Acid] Rash    Geodon [Ziprasidone] Rash    Lamictal [Lamotrigine] Rash       Review of Systems    Video Exam    There were no vitals filed for this visit  Physical Exam     I spent 45 minutes directly with the patient during this visit    VIRTUAL VISIT DISCLAIMER    Sharron Vela verbally agrees to participate in [employer]  Pt is aware that [employer] could be limited without vital signs or the ability to perform a full hands-on physical Ruben Headley understands she or the provider may request at any time to terminate the video visit and request the patient to seek care or treatment in person

## 2022-08-05 ENCOUNTER — TELEMEDICINE (OUTPATIENT)
Dept: BEHAVIORAL/MENTAL HEALTH CLINIC | Facility: CLINIC | Age: 62
End: 2022-08-05
Payer: COMMERCIAL

## 2022-08-05 DIAGNOSIS — F31.32 BIPOLAR 1 DISORDER, DEPRESSED, MODERATE (HCC): Primary | ICD-10-CM

## 2022-08-05 PROCEDURE — 90834 PSYTX W PT 45 MINUTES: CPT | Performed by: COUNSELOR

## 2022-08-05 NOTE — PSYCH
Virtual Regular Visit    Verification of patient location:    Patient is located in the following state in which I hold an [place] license [place]      Assessment/Plan:    Problem List Items Addressed This Visit        Other    Bipolar 1 disorder, depressed, moderate ([address] ) - Primary          Goals addressed in session: Goal 1 and Goal 2          Reason for visit is No chief complaint on file  Encounter provider Isi Bowers    Provider located at [address] [place] [id number] [phone]      Recent Visits  Date Type Provider Dept   07/29/22 310 [facility]   Showing recent visits within past 7 days and meeting all other requirements  Future Appointments  No visits were found meeting these conditions  Showing future appointments within next 150 days and meeting all other requirements       The patient was identified by name and date of birth  Soo Barrientos was informed that this is a telemedicine visit and that the visit is being conducted throughEpic Embedded and patient was informed this is a secure, HIPAA-complaint platform  She agrees to proceed  My office door was closed  No one else was in the room  She acknowledged consent and understanding of privacy and security of the video platform  The patient has agreed to participate and understands they can discontinue the visit at any time  Patient is aware this is a billable service  Subjective  Soo Barrientos is a 58 y o  female     D: Clinician met with Letty Pimentel via telehealth for individual therapy  Letty Pimentel processed stress related to having too many tasks that are mentally causing exhaustion  Clinician explored possible ways to manage the stressors to work on task completion for small tasks in order to work on improving mood   Letty Pimentel discussed small task she could complete and working on making lists to work on memory as well and task completion  She processed stress related memory concerns as well  Clinician normalized memory concerns with increased stress  A:   General Johnson was open and engaged in the session and states that she has been taking her medication as prescribed  P: Continue to meet with General Johnson weekly for individual therapy  HPI     No past medical history on file  No past surgical history on file  No current outpatient medications on file  No current facility-administered medications for this visit  Allergies   Allergen Reactions    Lithium Dizziness and Headache    Depakote [Valproic Acid] Rash    Geodon [Ziprasidone] Rash    Lamictal [Lamotrigine] Rash       Review of Systems    Video Exam    There were no vitals filed for this visit  Physical Exam     I spent 45 minutes directly with the patient during this visit    VIRTUAL VISIT DISCLAIMER    Kristen Barriga verbally agrees to participate in [employer]  Pt is aware that [employer] could be limited without vital signs or the ability to perform a full hands-on physical Yadira Alert understands she or the provider may request at any time to terminate the video visit and request the patient to seek care or treatment in person

## 2022-08-12 ENCOUNTER — TELEMEDICINE (OUTPATIENT)
Dept: BEHAVIORAL/MENTAL HEALTH CLINIC | Facility: CLINIC | Age: 62
End: 2022-08-12
Payer: COMMERCIAL

## 2022-08-12 DIAGNOSIS — F31.32 BIPOLAR 1 DISORDER, DEPRESSED, MODERATE (HCC): Primary | ICD-10-CM

## 2022-08-12 PROCEDURE — 90834 PSYTX W PT 45 MINUTES: CPT | Performed by: COUNSELOR

## 2022-08-12 NOTE — PSYCH
Virtual Regular Visit    Verification of patient location:    Patient is located in the following state in which I hold an [place] license [place]      Assessment/Plan:    Problem List Items Addressed This Visit        Other    Bipolar 1 disorder, depressed, moderate ([address] ) - Primary          Goals addressed in session: Goal 1 and Goal 2          Reason for visit is No chief complaint on file  Encounter provider Amelia Bangura    Provider located at [address] [place] [id number] [phone]      Recent Visits  Date Type Provider Dept   08/05/22 310 [facility]   Showing recent visits within past 7 days and meeting all other requirements  Future Appointments  No visits were found meeting these conditions  Showing future appointments within next 150 days and meeting all other requirements       The patient was identified by name and date of birth  Maldonado Santana was informed that this is a telemedicine visit and that the visit is being conducted throughEpic Embedded and patient was informed this is a secure, HIPAA-complaint platform  She agrees to proceed     My office door was closed  No one else was in the room  She acknowledged consent and understanding of privacy and security of the video platform  The patient has agreed to participate and understands they can discontinue the visit at any time  Patient is aware this is a billable service  Subjective  Maldonado Santana is a 58 y o  female     D: Clinician met with Jessica Santiago via telehealth for individual therapy  Jessica Santiago discussed follow through with attempting to get an appointment with Neuropsychiatry due to continued cognitive concerns, memory issued and overall focus and attention concerns   She reports following up with CRNP at least once a month and working to get on appropriate medication and help with memory concerns  She also processed speaking with her daughter about the possibility of getting a second opinion with neurologist  Clinician explored current symptoms and validated follow up with her providers to get a second opinion and ways to live with concerns until then  Carmita Oconnor was open and engaged in the session  She reports taking her medication as prescribed  P: Continue to meet with Pablo Butts weekly for individual therapy  HPI     No past medical history on file  No past surgical history on file  No current outpatient medications on file  No current facility-administered medications for this visit  Allergies   Allergen Reactions    Lithium Dizziness and Headache    Depakote [Valproic Acid] Rash    Geodon [Ziprasidone] Rash    Lamictal [Lamotrigine] Rash       Review of Systems    Video Exam    There were no vitals filed for this visit      Physical Exam     I spent 45 minutes directly with the patient during this visit

## 2022-08-19 ENCOUNTER — TELEMEDICINE (OUTPATIENT)
Dept: BEHAVIORAL/MENTAL HEALTH CLINIC | Facility: CLINIC | Age: 62
End: 2022-08-19
Payer: COMMERCIAL

## 2022-08-19 DIAGNOSIS — F31.32 BIPOLAR 1 DISORDER, DEPRESSED, MODERATE (HCC): Primary | ICD-10-CM

## 2022-08-19 PROCEDURE — 90834 PSYTX W PT 45 MINUTES: CPT | Performed by: COUNSELOR

## 2022-08-19 NOTE — PSYCH
Virtual Regular Visit    Verification of patient location:    Patient is located in the following state in which I hold an active license [place]      Assessment/Plan:    Problem List Items Addressed This Visit        Other    Bipolar 1 disorder, depressed, moderate ([address] ) - Primary          Goals addressed in session: Goal 1 and Goal 2          Reason for visit is No chief complaint on file  Encounter provider Tran Oneil    Provider located at [address] [place] [id number] [phone]      Recent Visits  Date Type Provider Dept   08/12/22 310 [facility]   Showing recent visits within past 7 days and meeting all other requirements  Future Appointments  No visits were found meeting these conditions  Showing future appointments within next 150 days and meeting all other requirements       The patient was identified by name and date of birth  Kathrin Deshpande was informed that this is a telemedicine visit and that the visit is being conducted throughEpic Embedded and patient was informed this is a secure, HIPAA-complaint platform  She agrees to proceed  My office door was closed  No one else was in the room  She acknowledged consent and understanding of privacy and security of the video platform  The patient has agreed to participate and understands they can discontinue the visit at any time  Patient is aware this is a billable service  Subjective  Kathrin Deshpande is a 58 y o  female     D: Clinician met with Jens Archer via telehealth for individual therapy  Jens Archer reports frustration with lack of follow up with calling to schedule neuropsychological testing  She reports that her daughter has been voicing concerns about memory and and trouble retaining information   She reports frustration with interactions with daughter where she is unable to remember directions  Clinician explored memory techniques that she has attempted in the past and working on regular schedule and healthy habits  Clinician touch based with boundaries with daughter and encouraged maintenence  A: Joo Casanova was open and engaged in the session  She reports attempts at memory techniques and writing down information for later use  P: Continue to meet with Joo Casanova weekly for individual therapy  HPI     No past medical history on file  No past surgical history on file  No current outpatient medications on file  No current facility-administered medications for this visit  Allergies   Allergen Reactions    Lithium Dizziness and Headache    Depakote [Valproic Acid] Rash    Geodon [Ziprasidone] Rash    Lamictal [Lamotrigine] Rash       Review of Systems    Video Exam    There were no vitals filed for this visit      Physical Exam     I spent 45 minutes directly with the patient during this visit

## 2022-08-26 ENCOUNTER — TELEMEDICINE (OUTPATIENT)
Dept: BEHAVIORAL/MENTAL HEALTH CLINIC | Facility: CLINIC | Age: 62
End: 2022-08-26
Payer: COMMERCIAL

## 2022-08-26 DIAGNOSIS — F31.32 BIPOLAR 1 DISORDER, DEPRESSED, MODERATE (HCC): Primary | ICD-10-CM

## 2022-08-26 PROCEDURE — 90834 PSYTX W PT 45 MINUTES: CPT | Performed by: COUNSELOR

## 2022-08-26 NOTE — PSYCH
Virtual Regular Visit    Verification of patient location:    Patient is located in the following state in which I hold an active license [place]      Assessment/Plan:    Problem List Items Addressed This Visit        Other    Bipolar 1 disorder, depressed, moderate ([address] ) - Primary          Goals addressed in session: Goal 1 and Goal 2          Reason for visit is No chief complaint on file  Encounter provider Garry Walker    Provider located at [address] [place] [id number] [phone]      Recent Visits  Date Type Provider Dept   08/19/22 310 [facility]   Showing recent visits within past 7 days and meeting all other requirements  Future Appointments  No visits were found meeting these conditions  Showing future appointments within next 150 days and meeting all other requirements       The patient was identified by name and date of birth  Roxann Blanchard was informed that this is a telemedicine visit and that the visit is being conducted through[employer]pic Embedded and patient was informed this is a secure, HIPAA-complaint platform  She agrees to proceed  My office door was closed  No one else was in the room  She acknowledged consent and understanding of privacy and security of the video platform  The patient has agreed to participate and understands they can discontinue the visit at any time  Patient is aware this is a billable service  Subjective  Roxann Blanchard is a 58 y o  female     D: Clinician met with Angelita Schaefer via telehealth for individual therapy  Angelita Schaefer processed recent death of her brother and working with her sister to make all arrangement for his services and remove his belongings from the home he was living in  Clinician validated this being a trigger to past grief and processed symptoms and normalized grief   She reports feeling better that she is able to do this with her sister and have the support of her family  Clinician encouraged continued social support and processed other possible resources in the community such as support groups  A: Pablo Butts presented with stable mood and affect  She reports taking her medication and processed recent MM appointment with doctor and recommendation for her to get blood work done to look into memory issues  P: Continue to meet with Pablo Butts weekly for individual therapy  HPI     No past medical history on file  No past surgical history on file  No current outpatient medications on file  No current facility-administered medications for this visit  Allergies   Allergen Reactions    Lithium Dizziness and Headache    Depakote [Valproic Acid] Rash    Geodon [Ziprasidone] Rash    Lamictal [Lamotrigine] Rash       Review of Systems    Video Exam    There were no vitals filed for this visit      Physical Exam     I spent 45 minutes directly with the patient during this visit

## 2022-09-12 ENCOUNTER — TELEPHONE (OUTPATIENT)
Dept: BEHAVIORAL/MENTAL HEALTH CLINIC | Facility: CLINIC | Age: 62
End: 2022-09-12

## 2022-09-16 ENCOUNTER — TELEMEDICINE (OUTPATIENT)
Dept: BEHAVIORAL/MENTAL HEALTH CLINIC | Facility: CLINIC | Age: 62
End: 2022-09-16
Payer: COMMERCIAL

## 2022-09-16 DIAGNOSIS — F31.32 BIPOLAR 1 DISORDER, DEPRESSED, MODERATE (HCC): Primary | ICD-10-CM

## 2022-09-16 PROCEDURE — 90834 PSYTX W PT 45 MINUTES: CPT | Performed by: COUNSELOR

## 2022-09-16 NOTE — PSYCH
Virtual Regular Visit    Verification of patient location:    Patient is located in the following state in which I hold an [place] license [place]      Assessment/Plan:    Problem List Items Addressed This Visit        Other    Bipolar 1 disorder, depressed, moderate ([address] ) - Primary          Goals addressed in session: Goal 1 and Goal 2          Reason for visit is No chief complaint on file  Encounter provider Emely Du    Provider located at [address] [place] [id number] [phone]      Recent Visits  Date Type Provider Dept   09/12/22 Telephone 502 [facility]   Showing recent visits within past 7 days and meeting all other requirements  Future Appointments  No visits were found meeting these conditions  Showing future appointments within next 150 days and meeting all other requirements       The patient was identified by name and date of birth  Lillian Garcia was informed that this is a telemedicine visit and that the visit is being conducted throughEpic Embedded and patient was informed this is a secure, HIPAA-complaint platform  She agrees to proceed     My office door was closed  No one else was in the room  She acknowledged consent and understanding of privacy and security of the video platform  The patient has agreed to participate and understands they can discontinue the visit at any time  Patient is aware this is a billable service  Subjective  Lillian Garcia is a 58 y o  female     D: Clinician met with Del Pozo via telehealth for individual therapy  Del Pozo processed recent missed appointment which she reports that she slept through  She reports being more tired lately with change of weather  She denied any increase in depressive symptoms than normal and feeling like her depression is manageable    Del Pozo discussed transition into the school year and spending more time with her daughter now that her granddaughter is back in school  She reports feeling good about this and getting the opportunity to bond and get advice for upcoming purchases and overall money management  Clinician praised and encouraged reaching out for assistance when needed  A: Arielle Albert presented with stable mood and affect and reports taking her medication as prescribed  P: Continue to meet with Arielle Albert weekly for individual therapy  HPI     No past medical history on file  No past surgical history on file  No current outpatient medications on file  No current facility-administered medications for this visit  Allergies   Allergen Reactions    Lithium Dizziness and Headache    Depakote [Valproic Acid] Rash    Geodon [Ziprasidone] Rash    Lamictal [Lamotrigine] Rash       Review of Systems    Video Exam    There were no vitals filed for this visit      Physical Exam     I spent 45 minutes directly with the patient during this visit

## 2022-09-23 ENCOUNTER — TELEMEDICINE (OUTPATIENT)
Dept: BEHAVIORAL/MENTAL HEALTH CLINIC | Facility: CLINIC | Age: 62
End: 2022-09-23
Payer: COMMERCIAL

## 2022-09-23 DIAGNOSIS — F31.32 BIPOLAR 1 DISORDER, DEPRESSED, MODERATE (HCC): Primary | ICD-10-CM

## 2022-09-23 PROCEDURE — 90834 PSYTX W PT 45 MINUTES: CPT | Performed by: COUNSELOR

## 2022-09-23 NOTE — PSYCH
Virtual Regular Visit    Verification of patient location:    Patient is located in the following state in which [place] an [place] license [place]      Assessment/Plan:    Problem List Items Addressed This Visit        Other    Bipolar 1 disorder, depressed, moderate ([address] ) - Primary          Goals addressed in session: Goal 1 and Goal 2          Reason for visit is No chief complaint on file  Encounter provider Isaiah Mullen    Provider located at [address] [place] [id number] [phone]      Recent Visits  Date Type Provider Dept   09/16/22 310 [facility]   Showing recent visits within past 7 days and meeting all other requirements  Future Appointments  No visits were found meeting these conditions  Showing future appointments within next 150 days and meeting all other requirements       The patient was identified by name and date of birth  Darrell Ramos was informed that this is a telemedicine visit and that the visit is being conducted throughEpic Embedded and patient was informed this is a secure, HIPAA-complaint platform  She agrees to proceed  My office door was closed  No one else was in the room  She acknowledged consent and understanding of privacy and security of the video platform  The patient has agreed to participate and understands they can discontinue the visit at any time  Patient is aware this is a billable service  Subjective  Darrell Ramos is a 58 y o  female     D: Clinician met with Jane Geiger via telehealth for individual therapy  Jane Geiger discussed upcoming PCP appointment to address physical health symptoms  She reports tiredness, fatigue and never feeling rested when she wakes up after sleeping 8-9 hours a night  She also reports hot flashes randomly throughout the week   She reports utilizing strategies of writing down concerns and questions she wants to address to make sure remembers everything  Clinician praised and validated this  Pastor Vaz reports agreeing to take her daughter to the store to shop for her grandsons birthday  She reports fear the her daughter will attempt to take  advantage of her and attempt to get her to buy her things  Clinician role played ways to set boundaries ahead of time to be clear about expectations  A: Pastor Vaz presented with stable mood and affect and was open and engaged in the session  She participated meaningfully in role play to prepare for upcoming visit with her daughter  P: Continue to meet with Pastor Vaz weekly for individual therapy  HPI     No past medical history on file  No past surgical history on file  No current outpatient medications on file  No current facility-administered medications for this visit  Allergies   Allergen Reactions    Lithium Dizziness and Headache    Depakote [Valproic Acid] Rash    Geodon [Ziprasidone] Rash    Lamictal [Lamotrigine] Rash       Review of Systems    Video Exam    There were no vitals filed for this visit      Physical Exam     I spent 45 minutes directly with the patient during this visit from 9:02- 947 AM

## 2022-09-30 ENCOUNTER — TELEMEDICINE (OUTPATIENT)
Dept: BEHAVIORAL/MENTAL HEALTH CLINIC | Facility: CLINIC | Age: 62
End: 2022-09-30
Payer: COMMERCIAL

## 2022-09-30 DIAGNOSIS — F31.32 BIPOLAR 1 DISORDER, DEPRESSED, MODERATE (HCC): Primary | ICD-10-CM

## 2022-09-30 PROCEDURE — 90834 PSYTX W PT 45 MINUTES: CPT | Performed by: COUNSELOR

## 2022-09-30 NOTE — PSYCH
Virtual Regular Visit    Verification of patient location:    Patient is located in the following state in which [place] an [place]      Assessment/Plan:    Problem List Items Addressed This Visit        Other    Bipolar 1 disorder, depressed, moderate ([address] ) - Primary          Goals addressed in session: Goal 1 and Goal 2          Reason for visit is No chief complaint on file  Encounter provider Rigoberto Valdivia    Provider located at [address] [place] [id number] [phone]      Recent Visits  Date Type Provider Dept   09/23/22 310 [facility]   Showing recent visits within past 7 days and meeting all other requirements  Future Appointments  No visits were found meeting these conditions  Showing future appointments within next 150 days and meeting all other requirements       The patient was identified by name and date of birth  Chris Trinidad was informed that this is a telemedicine visit and that the visit is being conducted throughEpic Embedded and patient was informed this is a secure, HIPAA-complaint platform  She agrees to proceed     My office door was closed  No one else was in the room  She acknowledged consent and understanding of privacy and security of the video platform  The patient has agreed to participate and understands they can discontinue the visit at any time  Patient is aware this is a billable service  Subjective  Chris Trinidad is a 58 y o  female     D: Clinician met with Fabiola Tijerina via telehealth for individual therapy  Fabiolaluis enrique Tijerina reports recently followed through with her PCP appointment and discussed her memory issues and increase heat flashes  Fabiola Tijerina reports that her doctor did not seemed concerned with symptoms and believes symptoms are part of normal aging   Fabiola Breezy reports feeling disappointment and unsure if she feels this is correct and would like to get a second opinion form a specialist She reports plans to follow up with a neurologist  Clinician explored feelings and what answers she is looking to get from her providers  Arielle Albert processed frustration with her daughter and ways to manage her emotions about situation  A: Arielle Albert presented with stable mood and affect  She reports increased depression over the last week and trouble sleeping  Clinician encouraged sleep hygiene and self care  P: Continue to meet with Arielle Albert weekly for individual therapy  HPI     No past medical history on file  No past surgical history on file  No current outpatient medications on file  No current facility-administered medications for this visit  Allergies   Allergen Reactions    Lithium Dizziness and Headache    Depakote [Valproic Acid] Rash    Geodon [Ziprasidone] Rash    Lamictal [Lamotrigine] Rash       Review of Systems    Video Exam    There were no vitals filed for this visit      Physical Exam     I spent 45 minutes directly with the patient during this visit from 9:00-9:45am

## 2022-10-07 ENCOUNTER — TELEMEDICINE (OUTPATIENT)
Dept: BEHAVIORAL/MENTAL HEALTH CLINIC | Facility: CLINIC | Age: 62
End: 2022-10-07
Payer: COMMERCIAL

## 2022-10-07 DIAGNOSIS — F31.32 BIPOLAR 1 DISORDER, DEPRESSED, MODERATE (HCC): Primary | ICD-10-CM

## 2022-10-07 PROCEDURE — 90834 PSYTX W PT 45 MINUTES: CPT | Performed by: COUNSELOR

## 2022-10-07 NOTE — PSYCH
Virtual Regular Visit    Verification of patient location:    Patient is located in the following state in which [place] an [place]      Assessment/Plan:    Problem List Items Addressed This Visit        Other    Bipolar 1 disorder, depressed, moderate ([address] ) - Primary          Goals addressed in session: Goal 1 and Goal 2          Reason for visit is No chief complaint on file  Encounter provider Asad Angel    Provider located at [address] [place] [id number] [phone]      Recent Visits  Date Type Provider Dept   09/30/22 310 [facility]   Showing recent visits within past 7 days and meeting all other requirements  Future Appointments  No visits were found meeting these conditions  Showing future appointments within next 150 days and meeting all other requirements       The patient was identified by name and date of birth  Rossy Cook was informed that this is a telemedicine visit and that the visit is being conducted throughEpic Embedded and patient was informed this is a secure, HIPAA-complaint platform  She agrees to proceed     My office door was closed  No one else was in the room  She acknowledged consent and understanding of privacy and security of the video platform  The patient has agreed to participate and understands they can discontinue the visit at any time  Patient is aware this is a billable service  Subjective  Rossy Cook is a 58 y o  female     D: Clinician met with Ozzy Hardin via telehealth for individual therapy  Ozzy Hardin discussed recent get together with her daughter and feeling like she was taken advantage of  Clinician explored situation and validated feelings  Clinician problem solved ways to communicate her feelings about the situation   She was able to pick a solution she felt fit her best and plan for the future  Clinician validated good problem solving and positive change  She discussed her issues with confrontation in the past and desire to work on this  A: Patric Buerger presented with stable mood and affect and reports taking medication as prescribed  She reports that she has been sick over the last week and has been working on self care and resting in order to recover  P: Continue to meet with Patric Buerger weekly for individual therapy  HPI     No past medical history on file  No past surgical history on file  No current outpatient medications on file  No current facility-administered medications for this visit  Allergies   Allergen Reactions    Lithium Dizziness and Headache    Depakote [Valproic Acid] Rash    Geodon [Ziprasidone] Rash    Lamictal [Lamotrigine] Rash       Review of Systems    Video Exam    There were no vitals filed for this visit      Physical Exam     I spent 45 minutes directly with the patient during this visit from 9:00-9:45 am

## 2022-10-14 ENCOUNTER — TELEMEDICINE (OUTPATIENT)
Dept: BEHAVIORAL/MENTAL HEALTH CLINIC | Facility: CLINIC | Age: 62
End: 2022-10-14
Payer: COMMERCIAL

## 2022-10-14 DIAGNOSIS — F31.32 BIPOLAR 1 DISORDER, DEPRESSED, MODERATE (HCC): Primary | ICD-10-CM

## 2022-10-14 PROCEDURE — 90834 PSYTX W PT 45 MINUTES: CPT | Performed by: COUNSELOR

## 2022-10-14 NOTE — PSYCH
Virtual Regular Visit  Visit Time    Visit Start Time: 9:00AM  Visit Stop Time: 9:45 AM  Total Visit Duration: 45 minutes    Verification of patient location:    Patient is located in the following state in which I hold an [place] license [place]      Assessment/Plan:    Problem List Items Addressed This Visit        Other    Bipolar 1 disorder, depressed, moderate [address] [address] ) - Primary          Goals addressed in session: Goal 1 and Goal 2          Reason for visit is No chief complaint on file  Encounter provider Serge Narvaez    Provider located at [address] [place] [id number] [phone]      Recent Visits  Date Type Provider Dept   10/07/22 310 [facility]   Showing recent visits within past 7 days and meeting all other requirements  Future Appointments  No visits were found meeting these conditions  Showing future appointments within next 150 days and meeting all other requirements       The patient was identified by name and date of birth  Kyree Mayfield was informed that this is a telemedicine visit and that the visit is being conducted through[employer] Now and patient was informed this is a secure, HIPAA-complaint platform  She agrees to proceed  My office door was closed  No one else was in the room  She acknowledged consent and understanding of privacy and security of the video platform  The patient has agreed to participate and understands they can discontinue the visit at any time  Patient is aware this is a billable service  Subjective  Kyree Mayfield is a 58 y o  female     D: Clinician met with Arielle Albert via telehealth for individual therapy  Arielle Albert processed recent interaction with her daughter and being able to say no to her and set boundary when she asked for her to pick her up   Clinician explored how she felt about this and praised following through setting a boundary and maintaining boundary  Pastor Vaz was able to recognize that it was positive for her to say no and that her daughter was then able to problem solve through the situation on her own  Clinician went over other ways of setting boundaries and communicating her needs to her daughter  A: Pastor Vaz presented with stable mood and affect  She reports taking her medication as prescribed  P: Continue to meet with Pastor Vaz weekly for individual therapy  HPI     No past medical history on file  No past surgical history on file  No current outpatient medications on file  No current facility-administered medications for this visit  Allergies   Allergen Reactions   • Lithium Dizziness and Headache   • Depakote [Valproic Acid] Rash   • Geodon [Ziprasidone] Rash   • Lamictal [Lamotrigine] Rash       Review of Systems    Video Exam    There were no vitals filed for this visit      Physical Exam     I spent 45 minutes directly with the patient during this visit

## 2022-10-21 ENCOUNTER — TELEMEDICINE (OUTPATIENT)
Dept: BEHAVIORAL/MENTAL HEALTH CLINIC | Facility: CLINIC | Age: 62
End: 2022-10-21

## 2022-10-21 DIAGNOSIS — F31.32 BIPOLAR 1 DISORDER, DEPRESSED, MODERATE (HCC): Primary | ICD-10-CM

## 2022-10-28 ENCOUNTER — TELEMEDICINE (OUTPATIENT)
Dept: BEHAVIORAL/MENTAL HEALTH CLINIC | Facility: CLINIC | Age: 62
End: 2022-10-28

## 2022-10-28 DIAGNOSIS — F31.32 BIPOLAR 1 DISORDER, DEPRESSED, MODERATE (HCC): Primary | ICD-10-CM

## 2022-10-28 NOTE — PSYCH
Virtual Regular Visit    Verification of patient location:    Patient is located in the following state in which [place] an [place]      Assessment/Plan:    Problem List Items Addressed This Visit        Other    Bipolar 1 disorder, depressed, moderate ([address] ) - Primary          Goals addressed in session: Goal 1 and Goal 2          Reason for visit is No chief complaint on file  Encounter provider Renea Pimentel    Provider located at [address] [place] [address]  [address] [facility] [address] [phone]      Recent Visits  Date Type Provider Dept   10/21/22 310 [facility]   Showing recent visits within past 7 days and meeting all other requirements  Future Appointments  No visits were found meeting these conditions  Showing future appointments within next 150 days and meeting all other requirements       The patient was identified by name and date of birth  Zoraida Murphy was informed that this is a telemedicine visit and that the visit is being conducted throughthe [employer]  She agrees to proceed     My office door was closed  No one else was in the room  She acknowledged consent and understanding of privacy and security of the video platform  The patient has agreed to participate and understands they can discontinue the visit at any time  Patient is aware this is a billable service  Subjective  Zoraida Murphy is a 58 y o  female     D: Clinician met with Herber Traore via telehealth for individual therapy  Herber Traore processed frustration with living with her daughter and working on feeling more part of their family instead of guilty about being an extra part of their family  Clinician explored communication between Herber Traore and her daughter and working on more assertive communication as well and sharing her feelings openly to gain greater understanding    Herber Traore processed anxiety related to unrealistic worries and thinking about he worst case scenarios and clinician worked on challenging those worries  A:Katherine presented with stable mood and affect and was open and engaged in the session  Santos Chaudhary reports taking her medication as prescribed  P: Continue to meet with Santos Chaudhary weekly for individual therapy  HPI     No past medical history on file  No past surgical history on file  No current outpatient medications on file  No current facility-administered medications for this visit  Allergies   Allergen Reactions   • Lithium Dizziness and Headache   • Depakote [Valproic Acid] Rash   • Geodon [Ziprasidone] Rash   • Lamictal [Lamotrigine] Rash       Review of Systems    Video Exam    There were no vitals filed for this visit      Physical Exam     Visit Time    Visit Start Time: 9:00am  Visit Stop Time: 9:45 am  Total Visit Duration: 45 minutes

## 2022-11-04 ENCOUNTER — TELEMEDICINE (OUTPATIENT)
Dept: BEHAVIORAL/MENTAL HEALTH CLINIC | Facility: CLINIC | Age: 62
End: 2022-11-04

## 2022-11-04 DIAGNOSIS — F31.32 BIPOLAR 1 DISORDER, DEPRESSED, MODERATE (HCC): Primary | ICD-10-CM

## 2022-11-04 NOTE — PSYCH
Virtual Regular Visit    Verification of patient location:    Patient is located in the following state in which [place] an [place]      Assessment/Plan:    Problem List Items Addressed This Visit        Other    Bipolar 1 disorder, depressed, moderate ([address] ) - Primary          Goals addressed in session: Goal 1 and Goal 2          Reason for visit is   Chief Complaint   Patient presents with   • Virtual Regular Visit          Encounter provider Jo-Ann Jane    Provider located at [address] [place] [id number] [phone]      Recent Visits  Date Type Provider Dept   10/28/22 310 [facility]   Showing recent visits within past 7 days and meeting all other requirements  Today's Visits  Date Type Provider Dept   22 310 [facility]   Showing today's visits and meeting all other requirements  Future Appointments  No visits were found meeting these conditions  Showing future appointments within next 150 days and meeting all other requirements       The patient was identified by name and date of birth  Shereen Luciano was informed that this is a telemedicine visit and that the visit is being [facility]  She agrees to proceed     My office door was closed  No one else was in the room  She acknowledged consent and understanding of privacy and security of the video platform  The patient has agreed to participate and understands they can discontinue the visit at any time  Patient is aware this is a billable service  Subjective  Shereen Luciano is a 58 y o  female     D: Clinician met with Rosita Galloway via telehealth for individual therapy  Rosita Galloway reports with the holidays coming up she has been missing her  paramour more    Clinician explored symptoms and validated grief symptoms  She discussed plans to decorate for [place] early as she never decorated for Halloween because of lack of energy and motivation but feels getting her [place] decoration out will help with mood  She also reports regularly trying to get outside and walk a bit a few times a week  Clinician reinforced and encouraged setting and maintaining healthy habits  A: Ghada Bond presented with stable mood and affect  She reports regularly follow through with coping skills and working on better assertive communication with daughter  P: Continue to meet with Ghada Bond weekly for individual therapy  HPI     No past medical history on file  No past surgical history on file  No current outpatient medications on file  No current facility-administered medications for this visit  Allergies   Allergen Reactions   • Lithium Dizziness and Headache   • Depakote [Valproic Acid] Rash   • Geodon [Ziprasidone] Rash   • Lamictal [Lamotrigine] Rash       Review of Systems    Video Exam    There were no vitals filed for this visit      Physical Exam     Visit Time    Visit Start Time: 9:02 am  Visit Stop Time: 9:47 am  Total Visit Duration: 45 minutes

## 2022-11-11 ENCOUNTER — TELEMEDICINE (OUTPATIENT)
Dept: BEHAVIORAL/MENTAL HEALTH CLINIC | Facility: CLINIC | Age: 62
End: 2022-11-11

## 2022-11-11 DIAGNOSIS — F31.32 BIPOLAR 1 DISORDER, DEPRESSED, MODERATE (HCC): Primary | ICD-10-CM

## 2022-11-11 NOTE — PSYCH
Virtual Regular Visit    Verification of patient location:    Patient is located in the following state in which I hold an active license [place]      Assessment/Plan:    Problem List Items Addressed This Visit        Other    Bipolar 1 disorder, depressed, moderate ([address] ) - Primary          Goals addressed in session: Goal 1 and Goal 2          Reason for visit is   Chief Complaint   Patient presents with   • Virtual Regular Visit          Encounter provider Bridget Santana    Provider located at [address] [place] [id number] [phone]      Recent Visits  Date Type Provider Dept   11/04/22 310 [facility]   Showing recent visits within past 7 days and meeting all other requirements  Today's Visits  Date Type Provider Dept   11/11/22 Telemedicine 502 [facility]   Showing today's visits and meeting all other requirements  Future Appointments  No visits were found meeting these conditions  Showing future appointments within next 150 days and meeting all other requirements       The patient was identified by name and date of birth  [place] Cabot was informed that this is a telemedicine visit and that the [facility]  She agrees to proceed     My office door was closed  No one else was in the room  She acknowledged consent and understanding of privacy and security of the video platform  The patient has agreed to participate and understands they can discontinue the visit at any time  Patient is aware this is a billable service  Subjective  Florida Cabot is a 58 y o  female     D: Clinician met with Ede Tee via telehealth for individual therapy  Ede Tee reports concerns that she is enabling her daughter and have been put in the middle of the situation   Clinician explored concerns and discussed ways to better communicate directly with daughter rather than making up excuses for why she cant come see her or take phone calls from her  Clinician role play potential ways to do this with her daughter and prioritize her own mental health  Clinician normalized feelings of guilt and shame when it comes to putting herself first and the benefits of doing so  A: Anel Musa presented with stable mood and affect and was engaged in the session  She reports willingness to work on better assertive communication  P: Continue to meet with Anel Musa weekly for individual therapy  HPI     No past medical history on file  No past surgical history on file  No current outpatient medications on file  No current facility-administered medications for this visit  Allergies   Allergen Reactions   • Lithium Dizziness and Headache   • Depakote [Valproic Acid] Rash   • Geodon [Ziprasidone] Rash   • Lamictal [Lamotrigine] Rash       Review of Systems    Video Exam    There were no vitals filed for this visit      Physical Exam     Visit Time    Visit Start Time: 9:00 am  Visit Stop Time: 9:45 am  Total Visit Duration: 45 minutes

## 2022-11-14 ENCOUNTER — TELEPHONE (OUTPATIENT)
Dept: PSYCHIATRY | Facility: CLINIC | Age: 62
End: 2022-11-14

## 2022-11-14 NOTE — TELEPHONE ENCOUNTER
Patient is calling regarding cancelling an appointment  Date/Time: 11/18 NOON    Reason: conflict with another apt  Patient was rescheduled: YES [] NO [x]  If yes, when was Patient reschedule for:   Pt had next apt scheduled       Patient requesting call back to reschedule: YES [] NO [x]

## 2022-11-25 ENCOUNTER — TELEMEDICINE (OUTPATIENT)
Dept: BEHAVIORAL/MENTAL HEALTH CLINIC | Facility: CLINIC | Age: 62
End: 2022-11-25

## 2022-11-25 DIAGNOSIS — F31.32 BIPOLAR 1 DISORDER, DEPRESSED, MODERATE (HCC): Primary | ICD-10-CM

## 2022-11-25 NOTE — PSYCH
Virtual Regular Visit    Verification of patient location:    Patient is located in the following state in which I hold an active license [place]      Assessment/Plan:    Problem List Items Addressed This Visit        Other    Bipolar 1 disorder, depressed, moderate ([address] ) - Primary       Goals addressed in session: Goal 1 and Goal 2          Reason for visit is   Chief Complaint   Patient presents with   • Virtual Regular Visit          Encounter provider Sridhar Sport    Provider located at [address] [place] [id number] [phone]      Recent Visits  Date Type Provider Dept   11/25/22 310 [facility]   Showing recent visits within past 7 days and meeting all other requirements  Future Appointments  No visits were found meeting these conditions  Showing future appointments within next 150 days and meeting all other requirements       The patient was identified by name and date of birth  Alvarado Cedillo was informed that this is a telemedicine visit and that the visit is being conducted through[address]  She agrees to proceed     My office door was closed  No one else was in the room  She acknowledged consent and understanding of privacy and security of the video platform  The patient has agreed to participate and understands they can discontinue the visit at any time  Patient is aware this is a billable service  Subjective  Alvarado Cedillo is a 58 y o  female     D: Clinician met with Alfredo Hernández via telehealth for individual therapy  Alfredo Hernández processed recent appointment with a new neurologist and plan to try new medication  She reports that her neurologist suspects her memory issues may be due to adult ADHD that has gone undiagnosed  She reports plans to try a stimulant medication to assist with focus and attention    Clinician explored how Zak Barrios is feeling about appointment and increased attentiveness to symptoms and working to find resolution  She reports that her daughter attended the appointment and feels they were able to get her questions answers and feel good about treatment plan moving forward  Clinician updated treatment plan with Zak Barrios during the session  A: Zak Barrios presented with stable mood and affect and was engaged in the session  She reports taking medication as prescribed and following through with doctors recommendations  P: Continue to meet with Zak Barrios weekly for individual therapy  HPI     No past medical history on file  No past surgical history on file  No current outpatient medications on file  No current facility-administered medications for this visit  Allergies   Allergen Reactions   • Lithium Dizziness and Headache   • Depakote [Valproic Acid] Rash   • Geodon [Ziprasidone] Rash   • Lamictal [Lamotrigine] Rash       Review of Systems    Video Exam    There were no vitals filed for this visit      Physical Exam     Visit Time    Visit Start Time: 9:00 am  Visit Stop Time: 9:43 am  Total Visit Duration: 43 minutes

## 2022-11-25 NOTE — BH TREATMENT PLAN
Tamir Swanson  1960         Date of Initial Treatment Plan: 8/31/21   Date of Current Treatment Plan:11/25/22     Treatment Plan Number 4     Strengths/Personal Resources for Self Care: Well establish coping skills, supportive family, caring , empathetic, compliant with medication     Diagnosis:   1  Bipolar disorder, current episode depressed, severe, without psychotic features (HCC)            Area of Needs: Healthy communication skills, coping skills, grief and loss        Long Term Goal 1: Decrease frequency, duration and intensity of depressive symptoms     Target Date:4/25/23  Completion Date:5/25/23         Short Term Objectives for Goal 1: 1  Teach and practice assetive communication skills 2  Increase use of relaxation skills  3  Increase use of healthy coping skills and self care     Long Term Goal 2: Process grief related symptoms     Target Date:4/25/23  Completion Date:5/25/23     Short Term Objectives for Goal 2: 1  Complete psycho education on grief and normalize feelings  2  Provide community resources for increased support as needed        GOAL 1: Modality: Individual 4x per month   Completion Date 5/25/23 and The person(s) responsible for carrying out the plan is Claudia Siddiqui     GOAL 2: Modality:  Individual 4x per month   Completion Date 5/25/23 and The person(s) responsible for carrying out the plan is Claudia Carranza, 1960, actively participated in the review and update of this treatment plan during a virtual session, using the [employer]  Tamir Kiki  provided verbal consent on 11/29/2022 at 9:40 AM  The treatment plan was transcribed into the Electronic Health Record at a later time

## 2022-12-02 ENCOUNTER — TELEPHONE (OUTPATIENT)
Dept: PSYCHIATRY | Facility: CLINIC | Age: 62
End: 2022-12-02

## 2022-12-02 NOTE — TELEPHONE ENCOUNTER
Left voicemail to inform patient that appt on 12/2/2022 with Dudley Boudreaux needed to be cancelled due to provider not being in the office for the day  Informed that office will be in contact in regards to future appt on 12/9/2022

## 2022-12-02 NOTE — TELEPHONE ENCOUNTER
Writer called Patient and spoke to Patient regarding upcoming kassi scheduled with Lady Mckinney will be cancel due to Provider is on leave    Patient is requesting to be scheduled with a temporally provider until lee return

## 2023-02-20 ENCOUNTER — TELEPHONE (OUTPATIENT)
Dept: PSYCHIATRY | Facility: CLINIC | Age: 63
End: 2023-02-20

## 2023-02-20 NOTE — TELEPHONE ENCOUNTER
Patient called and left message inquiring about scheduling for therapy  Called and spoke with patient and informed message will be sent for provider/staff to reach out and schedule upon providers return

## 2023-02-24 ENCOUNTER — TELEPHONE (OUTPATIENT)
Dept: PSYCHIATRY | Facility: CLINIC | Age: 63
End: 2023-02-24

## 2023-02-24 NOTE — TELEPHONE ENCOUNTER
Left voicemail asking patient to call the office back to schedule virtual visit with Venkata Gonzalez  Please only schedule a virtual visit at this time

## 2023-03-07 ENCOUNTER — TELEMEDICINE (OUTPATIENT)
Dept: BEHAVIORAL/MENTAL HEALTH CLINIC | Facility: CLINIC | Age: 63
End: 2023-03-07

## 2023-03-07 DIAGNOSIS — F31.32 BIPOLAR 1 DISORDER, DEPRESSED, MODERATE (HCC): Primary | ICD-10-CM

## 2023-03-07 NOTE — PSYCH
Virtual Regular Visit    Verification of patient location:    Patient is located in the following state in which I hold an active license [place]      Assessment/Plan:    Problem List Items Addressed This Visit        Other    Bipolar 1 disorder, depressed, moderate ([address] ) - Primary       Goals addressed in session: Goal 1 and Goal 2          Reason for visit is No chief complaint on file  Encounter provider Nicole Martinez    Provider located at [address] [place] [id number] [phone]      Recent Visits  No visits were found meeting these conditions  Showing recent visits within past 7 days and meeting all other requirements  Today's Visits  Date Type Provider Dept   03/07/23 Telemedicine 502 [facility]   Showing today's visits and meeting all other requirements  Future Appointments  No visits were found meeting these conditions  Showing future appointments within next 150 days and meeting all other requirements       The patient was identified by name and date of birth  Ashley Pierce was informed that this is a telemedicine visit and that the [facility]  She agrees to proceed     My office door was closed  No one else was in the room  She acknowledged consent and understanding of privacy and security of the video platform  The patient has agreed to participate and understands they can discontinue the visit at any time  Patient is aware this is a billable service  Subjective  Ashley Pierce is a 58 y o  female     D: Clinician met with Zander Vyas in person for individual therapy  Zander Vyas processed relationship with daughter and continued work on boundaries  She states that she feels she has made progress with this and continues to be firm with her boundaries as well as communicate with her daughter   Clinician praised and encouraged continued communication  Eddie Palumbo reports that she has been working on self help workbooks while clinician has been out of maternity leave and discussed efforts to be mindful and address negative self talk  Clinician explored strategies and encouraged continued use  A: Eddie Palumbo presented with stable mood and affect and was open and engaged in the session  She reports follow through with her provider and taking her medication as prescribed  P: Continue to meet with Eddie Palumbo weekly for individual therapy  HPI     No past medical history on file  No past surgical history on file  No current outpatient medications on file  No current facility-administered medications for this visit  Allergies   Allergen Reactions   • Lithium Dizziness and Headache   • Depakote [Valproic Acid] Rash   • Geodon [Ziprasidone] Rash   • Lamictal [Lamotrigine] Rash       Review of Systems    Video Exam    There were no vitals filed for this visit      Physical Exam     Visit Time    Visit Start Time: 9:00 am  Visit Stop Time: 9:52 am  Total Visit Duration: 52 minutes

## 2023-03-14 ENCOUNTER — TELEMEDICINE (OUTPATIENT)
Dept: BEHAVIORAL/MENTAL HEALTH CLINIC | Facility: CLINIC | Age: 63
End: 2023-03-14

## 2023-03-14 DIAGNOSIS — F31.32 BIPOLAR 1 DISORDER, DEPRESSED, MODERATE (HCC): Primary | ICD-10-CM

## 2023-03-14 NOTE — PSYCH
Virtual Regular Visit    Verification of patient location:    Patient is located in the following state in which I hold an active license [place]      Assessment/Plan:    Problem List Items Addressed This Visit        Other    Bipolar 1 disorder, depressed, moderate ([facility] [address] ) - Primary       Goals addressed in session: Goal 1 and Goal 2          Reason for visit is   Chief Complaint   Patient presents with   • Virtual Regular Visit          Encounter provider Mayo Gordon    Provider located at [address] [place] [id number] [phone]      Recent Visits  Date Type Provider Dept   03/07/23 310 [facility]   Showing recent visits within past 7 days and meeting all other requirements  Today's Visits  Date Type Provider Dept   03/14/23 Telemedicine 502 [facility]   Showing today's visits and meeting all other requirements  Future Appointments  No visits were found meeting these conditions  Showing future appointments within next 150 days and meeting all other requirements       The patient was identified by name and date of birth  Denise Gaytan was informed that this is a telemedicine visit and that [facility]  She agrees to proceed     My office door was closed  No one else was in the room  She acknowledged consent and understanding of privacy and security of the video platform  The patient has agreed to participate and understands they can discontinue the visit at any time  Patient is aware this is a billable service  Subjective  Denise Gaytan is a 61 y o  female       HPI     No past medical history on file  No past surgical history on file  No current outpatient medications on file  No current facility-administered medications for this visit  Allergies   Allergen Reactions   • Lithium Dizziness and Headache   • Depakote [Valproic Acid] Rash   • Geodon [Ziprasidone] Rash   • Lamictal [Lamotrigine] Rash       Review of Systems    Video Exam    There were no vitals filed for this visit  Physical Exam     Behavioral Health Psychotherapy Progress Note    Psychotherapy Provided: Individual Psychotherapy     1  Bipolar 1 disorder, depressed, moderate ([facility] [address] )            Goals addressed in session: Goal 1 and Goal 2     DATA: Clinician met with Marcelina Sarabia via telehealth for individual therapy  Marcelina Sarabia reports looking into the [facility] to find activities in the community that she can engage with others and get out of her home that she lives with her daughter and her family  She reports as she becomes more independent she reports feeling increased energy and self esteem  Clinician praised and encouraged this and discussed other possible activities in the future  Marcelina Sarabia processed interactions with her daughter and her family and feeling awkward and unwanted at times  During this session, this clinician used the following therapeutic modalities: Client-centered Therapy, Cognitive Behavioral Therapy and Mindfulness-based Strategies    Substance Abuse was not addressed during this session  If the client is diagnosed with a co-occurring substance use disorder, please indicate any changes in the frequency or amount of use: n/a  Stage of change for addressing substance use diagnoses: No substance use/Not applicable    ASSESSMENT:  Santy Payne presents with a Euthymic/ normal mood  her affect is Normal range and intensity, which is congruent, with her mood and the content of the session  The client has made progress on their goals  Santy Payne presents with a none risk of suicide, none risk of self-harm, and none risk of harm to others  For any risk assessment that surpasses a "low" rating, a safety plan must be developed      A safety plan was indicated: no  If yes, describe in detail n/a    PLAN: Between sessions, Patriciola Beto will contnued to work on boundaries and increasing healthy communication  At the next session, the therapist will use Client-centered Therapy, Mindfulness-based Strategies and Supportive Psychotherapy to address anxiety and depressive symptoms  Behavioral Health Treatment Plan and Discharge Planning: Alicia Pérez is aware of and agrees to continue to work on their treatment plan  They have identified and are working toward their discharge goals   yes    Visit start and stop times:    03/14/23  Start Time: 0803  Stop Time: 0848  Total Visit Time: 45 minutes

## 2023-03-28 ENCOUNTER — TELEMEDICINE (OUTPATIENT)
Dept: BEHAVIORAL/MENTAL HEALTH CLINIC | Facility: CLINIC | Age: 63
End: 2023-03-28

## 2023-03-28 DIAGNOSIS — F31.32 BIPOLAR 1 DISORDER, DEPRESSED, MODERATE (HCC): Primary | ICD-10-CM

## 2023-03-28 NOTE — PSYCH
Virtual Regular Visit    Verification of patient location:    Patient is located in the following state in which I hold an active license [place]      Assessment/Plan:    Problem List Items Addressed This Visit        Other    Bipolar 1 disorder, depressed, moderate ([facility] [address] ) - Primary       Goals addressed in session: Goal 1 and Goal 2          Reason for visit is No chief complaint on file  Encounter provider Saad Julio    Provider located at [address] [place] [address] [address] [facility] [id number] [phone]      Recent Visits  No visits were found meeting these conditions  Showing recent visits within past 7 days and meeting all other requirements  Today's Visits  Date Type Provider Dept   03/28/23 Telemedicine 502 [facility]   Showing today's visits and meeting all other requirements  Future Appointments  No visits were found meeting these conditions  Showing future appointments within next 150 days and meeting all other requirements       The patient was identified by name and date of birth  Bia Fraire was informed that this is a telemedicine visit and that the [facility]  She agrees to proceed     My office door was closed  No one else was in the room  She acknowledged consent and understanding of privacy and security of the video platform  The patient has agreed to participate and understands they can discontinue the visit at any time  Patient is aware this is a billable service  Subjective  Bia Fraire is a 61 y o  female       HPI     No past medical history on file  No past surgical history on file  No current outpatient medications on file  No current facility-administered medications for this visit          Allergies   Allergen Reactions   • Lithium Dizziness and Headache   • Depakote [Valproic Acid] Rash "  • Geodon [Ziprasidone] Rash   • Lamictal [Lamotrigine] Rash       Review of Systems    Video Exam    There were no vitals filed for this visit  Physical Exam     Behavioral Health Psychotherapy Progress Note    Psychotherapy Provided: Individual Psychotherapy     1  Bipolar 1 disorder, depressed, moderate ([address] )            Goals addressed in session: Goal 1 and Goal 2     DATA: Clinician met with Benny Hilmeagan via telehealth for individual therapy  Bennylito Ahumada discussed recent doctors appointment and following through with making an appointment with a cardiologist  She reports that he discussed all of her current health concerns with her provider and plans to follow up with recommendations  She processed efforts made in balancing her budget and feeling increase in self esteem in being able to follow through long term on her goals of getting debt paid off  Clinician praised and encouraged follow through and progress made with boundaries set with her adult daughter  During this session, this clinician used the following therapeutic modalities: Client-centered Therapy and Supportive Psychotherapy    Substance Abuse was not addressed during this session  If the client is diagnosed with a co-occurring substance use disorder, please indicate any changes in the frequency or amount of use: n/a  Stage of change for addressing substance use diagnoses: No substance use/Not applicable    ASSESSMENT:  Sharmila Conway presents with a Euthymic/ normal mood  her affect is Normal range and intensity, which is congruent, with her mood and the content of the session  The client has made progress on their goals  Sharmila Conway presents with a none risk of suicide, none risk of self-harm, and none risk of harm to others  For any risk assessment that surpasses a \"low\" rating, a safety plan must be developed      A safety plan was indicated: no  If yes, describe in detail n/a    PLAN: Between sessions, Sharmila Conway will work on " expressing her emotions in a healthy way  At the next session, the therapist will use Engagement Strategies and Client-centered Therapy to address anxiety  Behavioral Health Treatment Plan and Discharge Planning: Vicenta Robison is aware of and agrees to continue to work on their treatment plan  They have identified and are working toward their discharge goals   yes    Visit start and stop times:    03/28/23  Start Time: 0800  Stop Time: 0850  Total Visit Time: 50 minutes

## 2023-04-25 ENCOUNTER — TELEMEDICINE (OUTPATIENT)
Dept: BEHAVIORAL/MENTAL HEALTH CLINIC | Facility: CLINIC | Age: 63
End: 2023-04-25

## 2023-04-25 DIAGNOSIS — F31.32 BIPOLAR 1 DISORDER, DEPRESSED, MODERATE (HCC): Primary | ICD-10-CM

## 2023-04-25 NOTE — PSYCH
Virtual Regular Visit    Verification of patient location:    Patient is located at Home in the following state in which I hold an active license [place]      Assessment/Plan:    Problem List Items Addressed This Visit        Other    Bipolar 1 disorder, depressed, moderate ([facility] [place] [address] ) - Primary       Goals addressed in session: Goal 1 and Goal 2          Reason for visit is   Chief Complaint   Patient presents with   • Virtual Regular Visit          Encounter provider Saad Julio    Provider located at [address] [place] [id number] [phone]      Recent Visits  No visits were found meeting these conditions  Showing recent visits within past 7 days and meeting all other requirements  Today's Visits  Date Type Provider Dept   04/25/23 Telemedicine [facility]   Showing today's visits and meeting all other requirements  Future Appointments  No visits were found meeting these conditions  Showing future appointments within next 150 days and meeting all other requirements       The patient was identified by name and date of birth  Bia Fraire was informed that this is a telemedicine visit and that [facility]  She agrees to proceed     My office door was closed  No one else was in the room  She acknowledged consent and understanding of privacy and security of the video platform  The patient has agreed to participate and understands they can discontinue the visit at any time  Patient is aware this is a billable service  Subjective  Bia Fraire is a 61 y o  female       HPI     No past medical history on file  No past surgical history on file  No current outpatient medications on file  No current facility-administered medications for this visit          Allergies   Allergen Reactions   • Lithium "Dizziness and Headache   • Depakote [Valproic Acid] Rash   • Geodon [Ziprasidone] Rash   • Lamictal [Lamotrigine] Rash       Review of Systems    Video Exam    There were no vitals filed for this visit  Physical Exam     Behavioral Health Psychotherapy Progress Note    Psychotherapy Provided: Individual Psychotherapy     1  Bipolar 1 disorder, depressed, moderate ([facility] [address] )            Goals addressed in session: Goal 1 and Goal 2     DATA: Clinician met with West allis via telehealth for individual therapy  West allis processed interactions with her youngest daughter and feeling guilty and therefore helping her out more than what she would like  Clinician explored ways to communicate this to her daughter and work on a healthier relationship  She reports feeling like her daughter is getting more co dependent on her the more she does for her  Clinician explored possible ways to communicate assertively and maintain boundaries that she has set in the past with her daughter  During this session, this clinician used the following therapeutic modalities: Client-centered Therapy and Mindfulness-based Strategies    Substance Abuse was not addressed during this session  If the client is diagnosed with a co-occurring substance use disorder, please indicate any changes in the frequency or amount of use: n/a  Stage of change for addressing substance use diagnoses: No substance use/Not applicable    ASSESSMENT:  Donna Shi presents with a Euthymic/ normal mood  her affect is Normal range and intensity, which is congruent, with her mood and the content of the session  The client has made progress on their goals  Donna Shi presents with a none risk of suicide, none risk of self-harm, and none risk of harm to others  For any risk assessment that surpasses a \"low\" rating, a safety plan must be developed      A safety plan was indicated: no  If yes, describe in detail   n/a  PLAN: Between sessions, Donna Shi will " work to engage in the community in a meaningful way  At the next session, the therapist will use Client-centered Therapy, Mindfulness-based Strategies and Solution-Focused Therapy to address depression  Behavioral Health Treatment Plan and Discharge Planning: Inga Thornton is aware of and agrees to continue to work on their treatment plan  They have identified and are working toward their discharge goals   yes    Visit start and stop times:    04/25/23  Start Time: 0800  Stop Time: 0849  Total Visit Time: 49 minutes

## 2023-05-02 NOTE — PSYCH
Virtual Regular Visit    Verification of patient location:    Patient is located in the following state in which I hold an active license [place]      Assessment/Plan:    Problem List Items Addressed This Visit        Other    Bipolar 1 disorder, depressed, moderate ([place] [address] ) - Primary          Goals addressed in session: Goal 1 and Goal 2          Reason for visit is No chief complaint on file  Encounter provider Domitila Choi    Provider located at [address] [place] [id number] [phone]      Recent Visits  Date Type Provider Dept   10/14/22 [facility]   Showing recent visits within past 7 days and meeting all other requirements  Future Appointments  No visits were found meeting these conditions  Showing future appointments within next 150 days and meeting all other requirements       The patient was identified by name and date of birth  Candance Abu was informed that this is a telemedicine visit and that [facility]  She agrees to proceed     My office door was closed  No one else was in the room  She acknowledged consent and understanding of privacy and security of the video platform  The patient has agreed to participate and understands they can discontinue the visit at any time  Patient is aware this is a billable service  Subjective  Candance Abu is a 58 y o  female     D: Clinician met with Parminder Soni via telehealth for individual therapy  Parminder Soni processed progress made towards better assertive communication with daughter and other family  She reports that she has been working to be more open with her daughter and finding a balance in the home  Clinician praised and gave feedback on communication skills   Parminder Soni reports being frustrated with feeling like she has lost some independence since moving in with her daughter due to memory concerns  Clinician explored ways to problem solve and communicate feelings to her daughter  A:  Brittany Mazariegos reports feeling lack of energy which she believed is due to continued illness and working on recovery  P: Continue to meet with Brittany Mazariegos week for individual therapy  HPI     No past medical history on file  No past surgical history on file  No current outpatient medications on file  No current facility-administered medications for this visit  Allergies   Allergen Reactions   • Lithium Dizziness and Headache   • Depakote [Valproic Acid] Rash   • Geodon [Ziprasidone] Rash   • Lamictal [Lamotrigine] Rash       Review of Systems    Video Exam    There were no vitals filed for this visit      Physical Exam     I spent 45 minutes directly with the patient during this visit     Visit Time    Visit Start Time: 9:00 AM  Visit Stop Time: 9:45 AM  Total Visit Duration: 45 minutes Vaccine status unknown

## 2023-05-09 ENCOUNTER — TELEMEDICINE (OUTPATIENT)
Dept: BEHAVIORAL/MENTAL HEALTH CLINIC | Facility: CLINIC | Age: 63
End: 2023-05-09

## 2023-05-09 DIAGNOSIS — F31.32 BIPOLAR 1 DISORDER, DEPRESSED, MODERATE (HCC): Primary | ICD-10-CM

## 2023-05-09 NOTE — PSYCH
Virtual Regular Visit    Verification of patient location:    Patient is located at Home in the following state in which I hold an active license [place]      Assessment/Plan:    Problem List Items Addressed This Visit        Other    Bipolar 1 disorder, depressed, moderate ([facility] [address] ) - Primary       Goals addressed in session: Goal 1 and Goal 2          Reason for visit is   Chief Complaint   Patient presents with   • Virtual Regular Visit          Encounter provider Yessica Bedolla    Provider located at [address] [place] [id number] [phone]      Recent Visits  No visits were found meeting these conditions  Showing recent visits within past 7 days and meeting all other requirements  Today's Visits  Date Type Provider Dept   05/09/23 Telemedicine [facility]   Showing today's visits and meeting all other requirements  Future Appointments  No visits were found meeting these conditions  Showing future appointments within next 150 days and meeting all other requirements       The patient was identified by name and date of birth  Giles Pride was informed that this is a telemedicine visit and that the [facility]  She agrees to proceed     My office door was closed  No one else was in the room  She acknowledged consent and understanding of privacy and security of the video platform  The patient has agreed to participate and understands they can discontinue the visit at any time  Patient is aware this is a billable service  Subjective  Giles Pride is a 61 y o  female       HPI     No past medical history on file  No past surgical history on file  No current outpatient medications on file  No current facility-administered medications for this visit          Allergies   Allergen Reactions   • Lithium "Dizziness and Headache   • Depakote [Valproic Acid] Rash   • Geodon [Ziprasidone] Rash   • Lamictal [Lamotrigine] Rash       Review of Systems    Video Exam    There were no vitals filed for this visit  Physical Exam     Behavioral Health Psychotherapy Progress Note    Psychotherapy Provided: Individual Psychotherapy     1  Bipolar 1 disorder, depressed, moderate ([facility] [address] )            Goals addressed in session: Goal 1 and Goal 2     DATA: Clinician met with Jimena [place] via telehealth for individual therapy  Jimena Martinez discussed researching and attempts to engage in the community more and work to increase social support and independence  She reports plans to engage in a walking group in the future  Clinician explored benefits of this and her desire to be have more independent hobbies outside of her household  During this session, this clinician used the following therapeutic modalities: Engagement Strategies, Client-centered Therapy and Cognitive Processing Therapy    Substance Abuse was not addressed during this session  If the client is diagnosed with a co-occurring substance use disorder, please indicate any changes in the frequency or amount of use: n/a  Stage of change for addressing substance use diagnoses: No substance use/Not applicable    ASSESSMENT:  Dulce Engle presents with a Euthymic/ normal mood  her affect is Normal range and intensity, which is congruent, with her mood and the content of the session  The client has made progress on their goals  Dulce Engle presents with a none risk of suicide, none risk of self-harm, and none risk of harm to others  For any risk assessment that surpasses a \"low\" rating, a safety plan must be developed  A safety plan was indicated: no  If yes, describe in detail n/a    PLAN: Between sessions, Dulce Engle will work on expressing emotions in a healthy way   At the next session, the therapist will use Client-centered Therapy and Supportive Psychotherapy " to address depression  Behavioral Health Treatment Plan and Discharge Planning: Eloy Mack is aware of and agrees to continue to work on their treatment plan  They have identified and are working toward their discharge goals   yes    Visit start and stop times:    05/09/23  Start Time: 0805  Stop Time: 5368  Total Visit Time: 47 minutes

## 2023-05-23 ENCOUNTER — TELEMEDICINE (OUTPATIENT)
Dept: BEHAVIORAL/MENTAL HEALTH CLINIC | Facility: CLINIC | Age: 63
End: 2023-05-23

## 2023-05-23 DIAGNOSIS — F31.32 BIPOLAR 1 DISORDER, DEPRESSED, MODERATE (HCC): Primary | ICD-10-CM

## 2023-05-23 NOTE — BH CRISIS PLAN
"Client Name: Sven Arias       Client YOB: 1960  : 1960    Treatment Team (include name and contact information):     Psychotherapist: Josefina Crews MA, [facility]    Psychiatrist: Dr Linda Anderson   Release of information completed: no    \" n/a   Release of information completed: no    Other (Specify Role): n/a    Release of information completed: no    Other (Specify Role): n/a   Release of information completed: no    Healthcare Provider  Martin Koch, DO  [facility] [address]    Type of Plan   * Child plans (children 15 yo and younger) must be completed and signed by the child's legal guardian   * Plans for all individuals 15 yo and above must be signed by the client  Plan Type: adolescent/adult (15 and over) Initial      My Personal Strengths are (in the client's own words):  Close supportive family, good communication, medication compliant, good health    The stressors and triggers that may put me at risk are:  being physically tired, being hungry, loneliness, feeling a lack of control, being treated unfairly and people (describe - names, etc) Danyelle-daughter calling in distress    Coping skills I can use to keep myself calm and safe: Take a shower, Listen to music, Physical activity, Call a friend or family member, [place]/meditate and Increased contact with professional supports    Coping skills/supports I can use to maintain abstinence from substance use:   n/a    The people that provide me with help and support: (Include name, contact, and how they can help)   Support person #1: Danni Samuels- Daughter    * Phone number: in phone    * How can they help me? Support and understanding     Support person #2:Elmer    * Phone number: in phone    * How can they help me?  Offer help and support        In the past, the following has helped me in times of crisis:    Being in a quiet space, Taking medications, Talking to a professional on the telephone, " Going into the hospital, Calling a family member, Taking a walk or exercising, Breathing exercises (or other mindfulness-based activities), Listening to music and Watching television or a movie      If it is an emergency and you need immediate help, call 9-1-1    If there is a possibility of danger to yourself or others, call the following crisis hotline resources:     Adult Crisis Numbers  Suicide Prevention Hotline - Dial 9-8-8  [facility]: [address]: [address]: [address]: [phone]  [address] ([address]): [address] [facility] [address]: [address] [place] [address] Avenue: [address] [place] [address] St: [phone] (daytime)  [phone] (after hours, weekends, holidays)     Child/Adolescent Crisis Numbers   [facility]: [address] Z[address]: [phone]   Alma Dutch: [phone]   [address] ([facility]): [phone]    Please note: Some [facility] do not have a separate number for Child/Adolescent specific crisis  If your county is not listed under Child/Adolescent, please call the adult number for your county     National Talk to Text Line   All Ages - 280-221    In the event your feelings become unmanageable, and you cannot reach your support system, you will call 911 immediately or go to the nearest hospital emergency room  Aparna Acuna, 1960, actively participated in the creation of this crisis plan during a virtual session, using the [facility]  Aparna Acuna  provided verbal consent on 5/23/2023 at 8:40 AM  The treatment plan was transcribed into the Electronic Health Record at a later time

## 2023-05-23 NOTE — PSYCH
Virtual Regular Visit    Verification of patient location:    Patient is located at Home in the following state in which I hold an active license [place]      Assessment/Plan:    Problem List Items Addressed This Visit        Other    Bipolar 1 disorder, depressed, moderate ([facility] [address] ) - Primary       Goals addressed in session: Goal 1 and Goal 2          Reason for visit is   Chief Complaint   Patient presents with   • Virtual Regular Visit          Encounter provider Joshua Razo    Provider located at [address] [place] [id number] [phone]      Recent Visits  No visits were found meeting these conditions  Showing recent visits within past 7 days and meeting all other requirements  Today's Visits  Date Type Provider Dept   05/23/23 Telemedicine [facility]   Showing today's visits and meeting all other requirements  Future Appointments  No visits were found meeting these conditions  Showing future appointments within next 150 days and meeting all other requirements       The patient was identified by name and date of birth  Sarahy Courtney was informed that this is a telemedicine visit and that the visit is being conducted thr[facility]he [facility]  She agrees to proceed     My office door was closed  No one else was in the room  She acknowledged consent and understanding of privacy and security of the video platform  The patient has agreed to participate and understands they can discontinue the visit at any time  Patient is aware this is a billable service  Subjective  Sarahy Courtney is a 61 y o  female       HPI     No past medical history on file  No past surgical history on file  No current outpatient medications on file  No current facility-administered medications for this visit          Allergies   Allergen Reactions   • Lithium "Dizziness and Headache   • Depakote [Valproic Acid] Rash   • Geodon [Ziprasidone] Rash   • Lamictal [Lamotrigine] Rash       Review of Systems    Video Exam    There were no vitals filed for this visit  Physical Exam     Behavioral Health Psychotherapy Progress Note    Psychotherapy Provided: Individual Psychotherapy     1  Bipolar 1 disorder, depressed, moderate ([facility] [address] )            Goals addressed in session: Goal 1 and Goal 2     DATA: Clinician met with Sanjivlorne Bansal via telehealth for individual therapy  Clinician discussed progress made towards goals and updated treatment plan goals  Sona aBnsal discussed continues issues with setting healthy boundaries with her daughter and clinician gave feedback on potential strategies to assist with this such as setting expectations prior to getting together with her daughter so that she knows what the boundaries are  During this session, this clinician used the following therapeutic modalities: Client-centered Therapy and Supportive Psychotherapy    Substance Abuse was not addressed during this session  If the client is diagnosed with a co-occurring substance use disorder, please indicate any changes in the frequency or amount of use: n/a  Stage of change for addressing substance use diagnoses: No substance use/Not applicable    ASSESSMENT:  Gianni Vargas presents with a Euthymic/ normal mood  her affect is Normal range and intensity, which is congruent, with her mood and the content of the session  The client has made progress on their goals  Sona Bansal was open and engaged in the session  Gianni Vargas presents with a none risk of suicide, none risk of self-harm, and none risk of harm to others  For any risk assessment that surpasses a \"low\" rating, a safety plan must be developed  A safety plan was indicated: no  If yes, describe in detail n/a    PLAN: Between sessions, Gianni Vargas will work on healthy communication and setting boundaries   At the next session, the " therapist will use Client-centered Therapy and Supportive Psychotherapy to address anxiety and setting boundaries  Behavioral Health Treatment Plan and Discharge Planning: Krystle Gambino is aware of and agrees to continue to work on their treatment plan  They have identified and are working toward their discharge goals   yes    Visit start and stop times:    05/23/23  Start Time: 0803  Stop Time: 7682  Total Visit Time: 47 minutes

## 2023-05-23 NOTE — BH TREATMENT PLAN
Outpatient Behavioral Health Psychotherapy Treatment Plan    Claude Moulton  1960     Date of Initial Psychotherapy Assessment: 8/31/21   Date of Current Treatment Plan: 05/23/23  Treatment Plan Target Date: 10/19/23  Treatment Plan Expiration Date: 11/19/23    Diagnosis:   1  Bipolar 1 disorder, depressed, moderate (HCC)            Area(s) of Need: Setting boundaries, healthy communication, increased engagement in community/person hobbies  Long Term Goal 1 (in the client's own words): set boundaries with daughter    Stage of Change: Action    Target Date for completion: 10/19/23     Anticipated therapeutic modalities: Client Centered, Solution Focused, CBT     People identified to complete this goals: Virl Beat and Sisi Paul     Objective 1: (identify the means of measuring success in meeting the objective): Learn to assertively communicate needs and boundaries to adult daughter      Objective 2: (identify the means of measuring success in meeting the objective): Say 'no' to daughter to at least one event per month      Long Term Goal 2 (in the client's own words): decrease depressive symptoms    Stage of Change: Action    Target Date for completion: 10/19/23     Anticipated therapeutic modalities: Client Centered, supportive psychotherapy, CBT     People identified to complete this goal: Virl Beat and Sisi Paul      Objective 1: (identify the means of measuring success in meeting the objective): Teach and practice healthy coping skills in order to decrease depressive symptoms      Objective 2: (identify the means of measuring success in meeting the objective): Increase engagement in the community to at least one event per month outside of her home         I am currently under the care of a [facility] psychiatric provider: no    My [facility] psychiatric provider is: n/a  Dr Ara Traore    I am currently taking psychiatric medications: Yes, as prescribed    I feel that I will be ready for discharge from mental health care when I reach the following (measurable goal/objective): Be able to say no without guilt when my daughter asked me to do something I do not want to do  For children and adults who have a legal guardian:   Has there been any change to custody orders and/or guardianship status? NA  If yes, attach updated documentation  I have updated my Crisis Plan and have been offered a copy of this plan    [address]: Diagnosis and Treatment Plan explained to Iris John acknowledges an understanding of their diagnosis  Biju Tejada agrees to this treatment plan  I have been offered a copy of this Treatment Plan  Yes    Biju Tjeada, 1960, actively participated in the review and update of this treatment plan during a virtual session, using the Epic Embedded platform  Biju Tejada  provided verbal consent on 5/23/2023 at 8:30 AM  The treatment plan was transcribed into the Electronic Health Record at a later time  (3) slightly limited

## 2023-06-06 ENCOUNTER — TELEMEDICINE (OUTPATIENT)
Dept: BEHAVIORAL/MENTAL HEALTH CLINIC | Facility: CLINIC | Age: 63
End: 2023-06-06
Payer: COMMERCIAL

## 2023-06-06 DIAGNOSIS — F31.32 BIPOLAR 1 DISORDER, DEPRESSED, MODERATE (HCC): Primary | ICD-10-CM

## 2023-06-06 PROCEDURE — 90834 PSYTX W PT 45 MINUTES: CPT | Performed by: COUNSELOR

## 2023-06-06 NOTE — PSYCH
Virtual Regular Visit    Verification of patient location:    Patient is located at Home in the following state in which I hold an active license [place]      Assessment/Plan:    Problem List Items Addressed This Visit        Other    Bipolar 1 disorder, depressed, moderate ([facility] [address] ) - Primary       Goals addressed in session: Goal 1 and Goal 2          Reason for visit is   Chief Complaint   Patient presents with   • Virtual Regular Visit          Encounter provider Mellisa Osborne    Provider located at [address] [place] [id number] [phone]      Recent Visits  No visits were found meeting these conditions  Showing recent visits within past 7 days and meeting all other requirements  Today's Visits  Date Type Provider Dept   06/06/23 Telemedicine [facility]   Showing today's visits and meeting all other requirements  Future Appointments  No visits were found meeting these conditions  Showing future appointments within next 150 days and meeting all other requirements       The patient was identified by name and date of birth  Sherice Osborne was informed that this is a telemedicine visit and that [facility]  She agrees to proceed     My office door was closed  No one else was in the room  She acknowledged consent and understanding of privacy and security of the video platform  The patient has agreed to participate and understands they can discontinue the visit at any time  Patient is aware this is a billable service  Subjective  Sherice Osborne is a 61 y o  female    HPI     No past medical history on file  No past surgical history on file  No current outpatient medications on file  No current facility-administered medications for this visit          Allergies   Allergen Reactions   • Lithium "Dizziness and Headache   • Depakote [Valproic Acid] Rash   • Geodon [Ziprasidone] Rash   • Lamictal [Lamotrigine] Rash       Review of Systems    Video Exam    There were no vitals filed for this visit  Physical Exam     Behavioral Health Psychotherapy Progress Note    Psychotherapy Provided: Individual Psychotherapy     1  Bipolar 1 disorder, depressed, moderate ([facility] [address] )            Goals addressed in session: Goal 1 and Goal 2     DATA: Clinician met with Nuriachristina Whiting via telehealth for individual therapy  Nuriachristina Whiting processed recent interaction with daughter and attempts at maintaining boundaries  Clinician went over the positive and negatives in the interactions and ways to communicate with daughter  Asya Whiting reports she was proud of herself for some of the improvements that she has made  Clinician discussed positive progress made and working on better communication  Asya Johanne discussed mood changes at home and working through them  During this session, this clinician used the following therapeutic modalities: Client-centered Therapy, Solution-Focused Therapy and Supportive Psychotherapy    Substance Abuse was not addressed during this session  If the client is diagnosed with a co-occurring substance use disorder, please indicate any changes in the frequency or amount of use: n/a  Stage of change for addressing substance use diagnoses: No substance use/Not applicable    ASSESSMENT:  Meche Stover presents with a Euthymic/ normal mood  her affect is Normal range and intensity, which is congruent, with her mood and the content of the session  The client has made progress on their goals  Asya Ramosah was open and engaged in the session  Meche Stover presents with a none risk of suicide, none risk of self-harm, and none risk of harm to others  For any risk assessment that surpasses a \"low\" rating, a safety plan must be developed      A safety plan was indicated: no  If yes, describe in detail n/a    PLAN: Between sessions, " Tea Coleman will work on setting and maintaining boundaries  At the next session, the therapist will use Client-centered Therapy, Solution-Focused Therapy and Supportive Psychotherapy to address anxiety  Behavioral Health Treatment Plan and Discharge Planning: Tea Coleman is aware of and agrees to continue to work on their treatment plan  They have identified and are working toward their discharge goals   yes    Visit start and stop times:    06/06/23  Start Time: 0805  Stop Time: 4999  Total Visit Time: 46 minutes

## 2023-06-20 ENCOUNTER — TELEMEDICINE (OUTPATIENT)
Dept: BEHAVIORAL/MENTAL HEALTH CLINIC | Facility: CLINIC | Age: 63
End: 2023-06-20
Payer: COMMERCIAL

## 2023-06-20 DIAGNOSIS — F31.32 BIPOLAR 1 DISORDER, DEPRESSED, MODERATE (HCC): Primary | ICD-10-CM

## 2023-06-20 PROCEDURE — 90834 PSYTX W PT 45 MINUTES: CPT | Performed by: COUNSELOR

## 2023-06-20 NOTE — PSYCH
Virtual Regular Visit    Verification of patient location:    Patient is located at Home in the following state in which I hold an active license [place]      Assessment/Plan:    Problem List Items Addressed This Visit        Other    Bipolar 1 disorder, depressed, moderate ([facility] [address] ) - Primary       Goals addressed in session: Goal 1 and Goal 2          Reason for visit is   Chief Complaint   Patient presents with   • Virtual Regular Visit          Encounter provider Radha Peguero    Provider located at [address] [place] [id number] [phone]      Recent Visits  No visits were found meeting these conditions  Showing recent visits within past 7 days and meeting all other requirements  Today's Visits  Date Type Provider Dept   06/20/23 Telemedicine [facility]   Showing today's visits and meeting all other requirements  Future Appointments  No visits were found meeting these conditions  Showing future appointments within next 150 days and meeting all other requirements       The patient was identified by name and date of birth  Edmund Faulkner was informed that this is a telemedicine visit and that the [facility]  She agrees to proceed     My office door was closed  No one else was in the room  She acknowledged consent and understanding of privacy and security of the video platform  The patient has agreed to participate and understands they can discontinue the visit at any time  Patient is aware this is a billable service  Subjective  Edmund Faulkner is a 61 y o  female    HPI     No past medical history on file  No past surgical history on file  No current outpatient medications on file  No current facility-administered medications for this visit          Allergies   Allergen Reactions   • Lithium "Dizziness and Headache   • Depakote [Valproic Acid] Rash   • Geodon [Ziprasidone] Rash   • Lamictal [Lamotrigine] Rash       Review of Systems    Video Exam    There were no vitals filed for this visit  Physical Exam     Behavioral Health Psychotherapy Progress Note    Psychotherapy Provided: Individual Psychotherapy     1  Bipolar 1 disorder, depressed, moderate ([facility] [address] )            Goals addressed in session: Goal 1 and Goal 2     DATA: Clinician met with Citlalli Cleary via telehealth for individual therapy  Citlalli Cleary reports that daughter is concerned about cycling of her mood  She reports that she does not always have self awareness of this cycling but recognize that she has been under increase stress  Clinician explored stressors and ways that she manages her stress with use of grounding and coping skills  Clinician met with yeyo's daughter to discuss current symptoms and work on possible solutions and assisting her with coping skills  Clinician went over grounding exercise to be practiced at home  During this session, this clinician used the following therapeutic modalities: Client-centered Therapy and Supportive Psychotherapy    Substance Abuse was not addressed during this session  If the client is diagnosed with a co-occurring substance use disorder, please indicate any changes in the frequency or amount of use: n/a  Stage of change for addressing substance use diagnoses: No substance use/Not applicable    ASSESSMENT:  Giselle Rolle presents with a Anxious and Depressed mood  her affect is Normal range and intensity, which is congruent, with her mood and the content of the session  The client has made progress on their goals  Citlalli Cleary was open and engaged in the session  Giselle Rolle presents with a none risk of suicide, none risk of self-harm, and none risk of harm to others  For any risk assessment that surpasses a \"low\" rating, a safety plan must be developed      A safety plan was indicated: no  If yes, " describe in detail n/a    PLAN: Between sessions, Brian Flores will work on grounding   At the next session, the therapist will use Client-centered Therapy and Supportive Psychotherapy to address anxiety  Behavioral Health Treatment Plan and Discharge Planning: Brian Flores is aware of and agrees to continue to work on their treatment plan  They have identified and are working toward their discharge goals   yes    Visit start and stop times:    06/20/23  Start Time: 1003  Stop Time: 1053  Total Visit Time: 50 minutes

## 2023-07-05 ENCOUNTER — TELEMEDICINE (OUTPATIENT)
Dept: BEHAVIORAL/MENTAL HEALTH CLINIC | Facility: CLINIC | Age: 63
End: 2023-07-05
Payer: COMMERCIAL

## 2023-07-05 DIAGNOSIS — F31.32 BIPOLAR 1 DISORDER, DEPRESSED, MODERATE (HCC): Primary | ICD-10-CM

## 2023-07-05 PROCEDURE — 90834 PSYTX W PT 45 MINUTES: CPT | Performed by: COUNSELOR

## 2023-07-05 NOTE — PSYCH
Virtual Regular Visit    Verification of patient location:    Patient is located at Home in the following state in which I hold an active license [place]      Assessment/Plan:    Problem List Items Addressed This Visit        Other    Bipolar 1 disorder, depressed, moderate ([address]) - Primary       Goals addressed in session: Goal 1 and Goal 2          Reason for visit is No chief complaint on file. Encounter provider Marylou Collet    Provider located at [address] [place] [id number] [phone]      Recent Visits  No visits were found meeting these conditions. Showing recent visits within past 7 days and meeting all other requirements  Today's Visits  Date Type Provider Dept   07/05/23 Telemedicine [address]   Showing today's visits and meeting all other requirements  Future Appointments  No visits were found meeting these conditions. Showing future appointments within next 150 days and meeting all other requirements       The patient was identified by name and date of birth. Joelle Trevino was informed that this is a telemedicine visit and that [facility] [employer]. She agrees to proceed. .  My office door was closed. No one else was in the room. She acknowledged consent and understanding of privacy and security of the video platform. The patient has agreed to participate and understands they can discontinue the visit at any time. Patient is aware this is a billable service. Subjective  Joelle Trevino is a 61 y.o. female  . HPI     No past medical history on file. No past surgical history on file. No current outpatient medications on file. No current facility-administered medications for this visit.         Allergies   Allergen Reactions   • Lithium Dizziness and Headache   • Depakote [Valproic Acid] Rash   • Geodon [Ziprasidone] Rash   • Lamictal [Lamotrigine] Rash       Review of Systems    Video Exam    There were no vitals filed for this visit. Physical Exam     Behavioral Health Psychotherapy Progress Note    Psychotherapy Provided: Individual Psychotherapy     1. Bipolar 1 disorder, depressed, moderate ([address])            Goals addressed in session: Goal 1 and Goal 2     DATA: Clinician met with Cassandra Lanza via telehealth for individual therapy. Debbyludmila Danyeljamaal processed interactions with adult daughter and working to set boundaries with her as she feels she has been taking advantage of her again. Clinician explored recent interactions and provided feedback on setting and maintaining boundaries. Clinician explored barriers she is experiencing in setting boundaries with her daughter and guilt and shame she experience from how her daughters childhood was. During this session, this clinician used the following therapeutic modalities: Engagement Strategies, Client-centered Therapy, Solution-Focused Therapy and Supportive Psychotherapy    Substance Abuse was not addressed during this session. If the client is diagnosed with a co-occurring substance use disorder, please indicate any changes in the frequency or amount of use: n/a. Stage of change for addressing substance use diagnoses: No substance use/Not applicable    ASSESSMENT:  Danielito Goins presents with a Anxious mood. her affect is Normal range and intensity, which is congruent, with her mood and the content of the session. The client has made progress on their goals. Cassandra Lanza was open and engaged in the session. Danielito Goins presents with a none risk of suicide, none risk of self-harm, and none risk of harm to others. For any risk assessment that surpasses a "low" rating, a safety plan must be developed.     A safety plan was indicated: no  If yes, describe in detail n/a    PLAN: Between sessions, Danielito Goins will work on expressing her emotions in an assertive way. At the next session, the therapist will use Engagement Strategies, Client-centered Therapy, Solution-Focused Therapy and Supportive Psychotherapy to address anxiety and depressive symptoms. Behavioral Health Treatment Plan and Discharge Planning: Brayden Carmona is aware of and agrees to continue to work on their treatment plan. They have identified and are working toward their discharge goals.  yes    Visit start and stop times:    07/05/23  Start Time: 0805  Stop Time: 1079  Total Visit Time: 48 minutes

## 2023-07-11 ENCOUNTER — TELEMEDICINE (OUTPATIENT)
Dept: BEHAVIORAL/MENTAL HEALTH CLINIC | Facility: CLINIC | Age: 63
End: 2023-07-11
Payer: COMMERCIAL

## 2023-07-11 DIAGNOSIS — F31.32 BIPOLAR 1 DISORDER, DEPRESSED, MODERATE (HCC): Primary | ICD-10-CM

## 2023-07-11 PROCEDURE — 90834 PSYTX W PT 45 MINUTES: CPT | Performed by: COUNSELOR

## 2023-07-11 NOTE — PSYCH
Virtual Regular Visit    Verification of patient location:    Patient is located at Home in the following state in which I hold an active license [place]      Assessment/Plan:    Problem List Items Addressed This Visit        Other    Bipolar 1 disorder, depressed, moderate ([address]) - Primary       Goals addressed in session: Goal 1 and Goal 2          Reason for visit is No chief complaint on file. Encounter provider Rufino Friday    Provider located at [address] [place] [id number] [phone]      Recent Visits  Date Type Provider Dept   07/05/23 [address]   Showing recent visits within past 7 days and meeting all other requirements  Today's Visits  Date Type Provider Dept   07/11/23 Telemedicine [address]   Showing today's visits and meeting all other requirements  Future Appointments  No visits were found meeting these conditions. Showing future appointments within next 150 days and meeting all other requirements       The patient was identified by name and date of birth. Amara Starr was informed that this is a telemedicine visit and [facility] She agrees to proceed. .  My office door was closed. No one else was in the room. She acknowledged consent and understanding of privacy and security of the video platform. The patient has agreed to participate and understands they can discontinue the visit at any time. Patient is aware this is a billable service. Subjective  Amara Starr is a 61 y.o. female  . HPI     No past medical history on file. No past surgical history on file. No current outpatient medications on file. No current facility-administered medications for this visit.         Allergies   Allergen Reactions   • Lithium Dizziness and Headache   • Depakote [Valproic Acid] Rash   • Geodon [Ziprasidone] Rash   • Lamictal [Lamotrigine] Rash       Review of Systems    Video Exam    There were no vitals filed for this visit. Physical Exam     Behavioral Health Psychotherapy Progress Note    Psychotherapy Provided: Individual Psychotherapy     1. Bipolar 1 disorder, depressed, moderate ([address])            Goals addressed in session: Goal 1 and Goal 2     DATA: Clinician met with Giles Marino via telehealth for individual therapy. Giles Marino processed through most recent interaction with her daughter and advocating herself by setting a boundary and telling her 'no' to weekend plan. Debby Stark explored how she felt shame and guilt about situation and past parenting. Clinician explored what is and is not under her control and ways to engage with daughter with boundaries. During this session, this clinician used the following therapeutic modalities: Engagement Strategies, Client-centered Therapy and Supportive Psychotherapy    Substance Abuse was not addressed during this session. If the client is diagnosed with a co-occurring substance use disorder, please indicate any changes in the frequency or amount of use: n/a. Stage of change for addressing substance use diagnoses: No substance use/Not applicable    ASSESSMENT:  Sheree Beckman presents with a Euthymic/ normal and Anxious mood. her affect is Normal range and intensity, which is congruent, with her mood and the content of the session. The client has made progress on their goals. Giles Marino was open and engaged in the session. Sheree Beckman presents with a none risk of suicide, none risk of self-harm, and none risk of harm to others. For any risk assessment that surpasses a "low" rating, a safety plan must be developed. A safety plan was indicated: no  If yes, describe in detail n/a    PLAN: Between sessions, Sheree Beckman will work on setting and maintaining boundary.  At the next session, the therapist will use Engagement Strategies, Client-centered Therapy and Cognitive Processing Therapy to address boundaries and assertive communication of needs. .    Behavioral Health Treatment Plan and Discharge Planning: Meryl Carmen is aware of and agrees to continue to work on their treatment plan. They have identified and are working toward their discharge goals.  yes    Visit start and stop times:    07/11/23  Start Time: 1005  Stop Time: 1053  Total Visit Time: 48 minutes

## 2023-07-18 ENCOUNTER — TELEMEDICINE (OUTPATIENT)
Dept: BEHAVIORAL/MENTAL HEALTH CLINIC | Facility: CLINIC | Age: 63
End: 2023-07-18
Payer: COMMERCIAL

## 2023-07-18 DIAGNOSIS — F31.32 BIPOLAR 1 DISORDER, DEPRESSED, MODERATE (HCC): Primary | ICD-10-CM

## 2023-07-18 PROCEDURE — 90834 PSYTX W PT 45 MINUTES: CPT | Performed by: COUNSELOR

## 2023-07-18 NOTE — PSYCH
Virtual Regular Visit    Verification of patient location:    Patient is located at Home in the following state in which I hold an active license [place]      Assessment/Plan:    Problem List Items Addressed This Visit        Other    Bipolar 1 disorder, depressed, moderate ([address]) - Primary       Goals addressed in session: Goal 1 and Goal 2          Reason for visit is   Chief Complaint   Patient presents with   • Virtual Regular Visit          Encounter provider Tigre Maldonado    Provider located at [address] [place] [id number] [phone]      Recent Visits  Date Type Provider Dept   07/11/23 [address]   Showing recent visits within past 7 days and meeting all other requirements  Today's Visits  Date Type Provider Dept   07/18/23 Telemedicine [address]   Showing today's visits and meeting all other requirements  Future Appointments  No visits were found meeting these conditions. Showing future appointments within next 150 days and meeting all other requirements       The patient was identified by name and date of birth. Bandar Estevez was informed that this is a telemedicine visit and [facility] She agrees to proceed. .  My office door was closed. No one else was in the room. She acknowledged consent and understanding of privacy and security of the video platform. The patient has agreed to participate and understands they can discontinue the visit at any time. Patient is aware this is a billable service. Subjective  Bandar Estevez is a 61 y.o. female       HPI     No past medical history on file. No past surgical history on file. No current outpatient medications on file. No current facility-administered medications for this visit. Allergies   Allergen Reactions   • Lithium Dizziness and Headache   • Depakote [Valproic Acid] Rash   • Geodon [Ziprasidone] Rash   • Lamictal [Lamotrigine] Rash       Review of Systems    Video Exam    There were no vitals filed for this visit. Physical Exam     Behavioral Health Psychotherapy Progress Note    Psychotherapy Provided: Individual Psychotherapy     1. Bipolar 1 disorder, depressed, moderate ([address])            Goals addressed in session: Goal 1 and Goal 2     DATA: Clinician met with Cassandra Lanza via telehealth for individual therapy. Cassandra Lanza processed upcoming visit with her adult daughter and working to express her emotions as well as maintain healthy boundaries. Cassandra Lanza discussed her concerns and issues she has had in the past with feeling responsible for helping her daughter and facilitating contact with her and her children. During this session, this clinician used the following therapeutic modalities: Client-centered Therapy, Solution-Focused Therapy and Supportive Psychotherapy    Substance Abuse was not addressed during this session. If the client is diagnosed with a co-occurring substance use disorder, please indicate any changes in the frequency or amount of use: n/a. Stage of change for addressing substance use diagnoses: No substance use/Not applicable    ASSESSMENT:  Danielito Goins presents with a Euthymic/ normal and Anxious mood. her affect is Normal range and intensity, which is congruent, with her mood and the content of the session. The client has made progress on their goals. Cassandra Lanza was open and engaged in the session. Danielito Goins presents with a none risk of suicide, none risk of self-harm, and none risk of harm to others. For any risk assessment that surpasses a "low" rating, a safety plan must be developed.     A safety plan was indicated: no  If yes, describe in detail n/a    PLAN: Between sessions, Danielito Goins will work on setting and maintaining boundaries with adult daughter. At the next session, the therapist will use Engagement Strategies, Client-centered Therapy and Supportive Psychotherapy to address anxiety and mood stabilization. .    Behavioral Health Treatment Plan and Discharge Planning: Herminio Bryan is aware of and agrees to continue to work on their treatment plan. They have identified and are working toward their discharge goals.  yes    Visit start and stop times:    07/18/23  Start Time: 0800  Stop Time: 0850  Total Visit Time: 50 minutes

## 2023-07-25 ENCOUNTER — TELEMEDICINE (OUTPATIENT)
Dept: BEHAVIORAL/MENTAL HEALTH CLINIC | Facility: CLINIC | Age: 63
End: 2023-07-25
Payer: COMMERCIAL

## 2023-07-25 DIAGNOSIS — F31.32 BIPOLAR 1 DISORDER, DEPRESSED, MODERATE (HCC): Primary | ICD-10-CM

## 2023-07-25 PROCEDURE — 90834 PSYTX W PT 45 MINUTES: CPT | Performed by: COUNSELOR

## 2023-07-25 NOTE — PSYCH
Virtual Regular Visit    Verification of patient location:    Patient is located at Home in the following state in which I hold an active license [place]      Assessment/Plan:    Problem List Items Addressed This Visit        Other    Bipolar 1 disorder, depressed, moderate ([address]) - Primary       Goals addressed in session: Goal 1 and Goal 2          Reason for visit is No chief complaint on file. Encounter provider Uvaldo Greene    Provider located at [address] [place] [id number] [phone]      Recent Visits  Date Type Provider Dept   07/18/23 [address]   Showing recent visits within past 7 days and meeting all other requirements  Today's Visits  Date Type Provider Dept   07/25/23 Telemedicine [address]   Showing today's visits and meeting all other requirements  Future Appointments  No visits were found meeting these conditions. Showing future appointments within next 150 days and meeting all other requirements       The patient was identified by name and date of birth. Brayden Carmona was informed that this is a telemedicine visit and [facility] She agrees to proceed. .  My office door was closed. No one else was in the room. She acknowledged consent and understanding of privacy and security of the video platform. The patient has agreed to participate and understands they can discontinue the visit at any time. Patient is aware this is a billable service. Subjective  Brayden Carmona is a 61 y.o. female       HPI     No past medical history on file. No past surgical history on file. No current outpatient medications on file. No current facility-administered medications for this visit.         Allergies   Allergen Reactions   • Lithium Dizziness and Headache   • Depakote [Valproic Acid] Rash   • Geodon [Ziprasidone] Rash   • Lamictal [Lamotrigine] Rash       Review of Systems    Video Exam    There were no vitals filed for this visit. Physical Exam     Behavioral Health Psychotherapy Progress Note    Psychotherapy Provided: Individual Psychotherapy     1. Bipolar 1 disorder, depressed, moderate ([address])            Goals addressed in session: Goal 1 and Goal 2     DATA: Clinician met with Chava Ashton via telehealth for individual therapy. Chava Ashton processed most recent interaction with her daughter and pros and cons of the visit. Clinician provided feedback on ways to continue to reinforce boundaries and communicate her needs to her daughter. She reports feeling good about making small steps towards better boundaries. Clinician checked in with Chava Ashton on shame and guilt she feels when she is not able to be there for her daughter more. During this session, this clinician used the following therapeutic modalities: Engagement Strategies and Client-centered Therapy    Substance Abuse was not addressed during this session. If the client is diagnosed with a co-occurring substance use disorder, please indicate any changes in the frequency or amount of use: n/a. Stage of change for addressing substance use diagnoses: No substance use/Not applicable    ASSESSMENT:  Chelle Hopper presents with a Anxious and Depressed mood. her affect is Normal range and intensity, which is congruent, with her mood and the content of the session. The client has made progress on their goals. Chava Ashton was open and engaged in the session. Chelle Hopper presents with a none risk of suicide, none risk of self-harm, and none risk of harm to others. For any risk assessment that surpasses a "low" rating, a safety plan must be developed.     A safety plan was indicated: no  If yes, describe in detail n/a    PLAN: Between sessions, Chelle Hopper will continue to work on setting and maintaining boundaries and healthy communication. At the next session, the therapist will use Engagement Strategies, Client-centered Therapy, Solution-Focused Therapy and Supportive Psychotherapy to address anxiety and depressive symptoms. Behavioral Health Treatment Plan and Discharge Planning: Chelle Hopper is aware of and agrees to continue to work on their treatment plan. They have identified and are working toward their discharge goals.  yes    Visit start and stop times:    07/25/23  Start Time: 0805  Stop Time: 5736  Total Visit Time: 50 minutes

## 2023-08-01 ENCOUNTER — TELEMEDICINE (OUTPATIENT)
Dept: BEHAVIORAL/MENTAL HEALTH CLINIC | Facility: CLINIC | Age: 63
End: 2023-08-01
Payer: COMMERCIAL

## 2023-08-01 DIAGNOSIS — F31.32 BIPOLAR 1 DISORDER, DEPRESSED, MODERATE (HCC): Primary | ICD-10-CM

## 2023-08-01 PROCEDURE — 90834 PSYTX W PT 45 MINUTES: CPT | Performed by: COUNSELOR

## 2023-08-01 NOTE — PSYCH
Virtual Regular Visit    Verification of patient location:    Patient is located at Home in the following state in which I hold an active license [place]      Assessment/Plan:    Problem List Items Addressed This Visit        Other    Bipolar 1 disorder, depressed, moderate ([address]) - Primary       Goals addressed in session: Goal 1 and Goal 2          Reason for visit is No chief complaint on file. Encounter provider Tanya Arriaga    Provider located at [address] [place] [id number] [phone]      Recent Visits  Date Type Provider Dept   07/25/23 [address]   Showing recent visits within past 7 days and meeting all other requirements  Today's Visits  Date Type Provider Dept   08/01/23 Telemedicine [address]   Showing today's visits and meeting all other requirements  Future Appointments  No visits were found meeting these conditions. Showing future appointments within next 150 days and meeting all other requirements       The patient was identified by name and date of birth. Margarita Ochoa was informed that this is a telemedicine visit and [facility] She agrees to proceed. .  My office door was closed. No one else was in the room. She acknowledged consent and understanding of privacy and security of the video platform. The patient has agreed to participate and understands they can discontinue the visit at any time. Patient is aware this is a billable service. Subjective  Margarita Ochoa is a 61 y.o. female  . HPI     No past medical history on file. No past surgical history on file. No current outpatient medications on file. No current facility-administered medications for this visit.         Allergies   Allergen Reactions   • Lithium Dizziness and Headache   • Depakote [Valproic Acid] Rash   • Geodon [Ziprasidone] Rash   • Lamictal [Lamotrigine] Rash       Review of Systems    Video Exam    There were no vitals filed for this visit. Physical Exam     Behavioral Health Psychotherapy Progress Note    Psychotherapy Provided: Individual Psychotherapy     1. Bipolar 1 disorder, depressed, moderate ([address])            Goals addressed in session: Goal 1 and Goal 2     DATA:  Clinician met with Toro Horton via telehealth for individual therapy. Toro Horton discussed feelings of abandonment with her daughter and her family going away for the day. Clinician explored feelings and possible origins. Toro Horton was able to process through past incidents where she felt abandoned by family and friends and how she managed those feelings. She discussed esvin fearfulness in being in her home alone for the day and clinician explored strategies to help her feel safe. During this session, this clinician used the following therapeutic modalities: Engagement Strategies, Client-centered Therapy and Supportive Psychotherapy    Substance Abuse was not addressed during this session. If the client is diagnosed with a co-occurring substance use disorder, please indicate any changes in the frequency or amount of use: n/a. Stage of change for addressing substance use diagnoses: No substance use/Not applicable    ASSESSMENT:  Jalen Mackey presents with a Anxious mood. her affect is Normal range and intensity, which is congruent, with her mood and the content of the session. The client has made progress on their goals. Toro Horton was open and engaged in the session. Jalen Mackey presents with a none risk of suicide, none risk of self-harm, and none risk of harm to others. For any risk assessment that surpasses a "low" rating, a safety plan must be developed.     A safety plan was indicated: no  If yes, describe in detail n/a    PLAN: Between sessions, Jalen Mackey will work on self soothing strategies and overall coping strategies. At the next session, the therapist will use Engagement Strategies, Client-centered Therapy and Supportive Psychotherapy to address anxiety. Behavioral Health Treatment Plan and Discharge Planning: Ashley Zuñiga is aware of and agrees to continue to work on their treatment plan. They have identified and are working toward their discharge goals.  yes    Visit start and stop times:    08/01/23  Start Time: 1000  Stop Time: 1050  Total Visit Time: 50 minutes

## 2023-08-15 ENCOUNTER — TELEMEDICINE (OUTPATIENT)
Dept: BEHAVIORAL/MENTAL HEALTH CLINIC | Facility: CLINIC | Age: 63
End: 2023-08-15
Payer: COMMERCIAL

## 2023-08-15 DIAGNOSIS — F31.32 BIPOLAR 1 DISORDER, DEPRESSED, MODERATE (HCC): Primary | ICD-10-CM

## 2023-08-15 PROCEDURE — 90834 PSYTX W PT 45 MINUTES: CPT | Performed by: COUNSELOR

## 2023-08-15 NOTE — PSYCH
Virtual Regular Visit    Verification of patient location:    Patient is located at Home in the following state in which I hold an active license [place]      Assessment/Plan:    Problem List Items Addressed This Visit        Other    Bipolar 1 disorder, depressed, moderate ([address]) - Primary       Goals addressed in session: Goal 1 and Goal 2          Reason for visit is   Chief Complaint   Patient presents with   • Virtual Regular Visit          Encounter provider Ann Downs    Provider located at [address] [place] [address] [place] [id number] [phone]      Recent Visits  No visits were found meeting these conditions. Showing recent visits within past 7 days and meeting all other requirements  Today's Visits  Date Type Provider Dept   08/15/23 Telemedicine [address]   Showing today's visits and meeting all other requirements  Future Appointments  No visits were found meeting these conditions. Showing future appointments within next 150 days and meeting all other requirements       The patient was identified by name and date of birth. Jeff Friend was informed that this is a telemedicine visit and [facility] She agrees to proceed. .  My office door was closed. No one else was in the room. She acknowledged consent and understanding of privacy and security of the video platform. The patient has agreed to participate and understands they can discontinue the visit at any time. Patient is aware this is a billable service. Subjective  Jeff Friend is a 61 y.o. female  . HPI     No past medical history on file. No past surgical history on file. No current outpatient medications on file. No current facility-administered medications for this visit.         Allergies   Allergen Reactions   • Lithium Dizziness and Headache   • Depakote [Valproic Acid] Rash   • Geodon [Ziprasidone] Rash   • Lamictal [Lamotrigine] Rash       Review of Systems    Video Exam    There were no vitals filed for this visit. Physical Exam     Behavioral Health Psychotherapy Progress Note    Psychotherapy Provided: Individual Psychotherapy     1. Bipolar 1 disorder, depressed, moderate ([address])            Goals addressed in session: Goal 1 and Goal 2     DATA: Clinician met with Abdiaziz Benton via telehealth for individual therapy. Abdiaziz Benton discussed possible future plans and getting more information on potential community resources. She reports desire to engage in the community more and increase her own hobbies and interests outside of her immediate family. Clinician explored benefits of this and provided possible referrals. Abdiaziz Benton discussed decision making with living situation and considering living on her own in the future. Clinician explored feelings. During this session, this clinician used the following therapeutic modalities: Engagement Strategies, Client-centered Therapy and Supportive Psychotherapy    Substance Abuse was not addressed during this session. If the client is diagnosed with a co-occurring substance use disorder, please indicate any changes in the frequency or amount of use: n/a. Stage of change for addressing substance use diagnoses: No substance use/Not applicable    ASSESSMENT:  Namita Alfaro presents with a Euthymic/ normal mood. her affect is Normal range and intensity, which is congruent, with her mood and the content of the session. The client has made progress on their goals. Abdiaziz Benton was open and engaged in the session. Namita Alfaro presents with a none risk of suicide, none risk of self-harm, and none risk of harm to others. For any risk assessment that surpasses a "low" rating, a safety plan must be developed.     A safety plan was indicated: no  If yes, describe in detail n/a    PLAN: Between sessions, Meryl Carmen will work on healthy expression of needs. At the next session, the therapist will use Engagement Strategies and Client-centered Therapy to address anxiety and depressive symptoms. Behavioral Health Treatment Plan and Discharge Planning: Meryl Carmen is aware of and agrees to continue to work on their treatment plan. They have identified and are working toward their discharge goals.  yes    Visit start and stop times:    08/15/23  Start Time: 0803  Stop Time: 8928  Total Visit Time: 44 minutes

## 2023-08-23 ENCOUNTER — TELEMEDICINE (OUTPATIENT)
Dept: BEHAVIORAL/MENTAL HEALTH CLINIC | Facility: CLINIC | Age: 63
End: 2023-08-23
Payer: COMMERCIAL

## 2023-08-23 DIAGNOSIS — F31.32 BIPOLAR 1 DISORDER, DEPRESSED, MODERATE (HCC): Primary | ICD-10-CM

## 2023-08-23 PROCEDURE — 90834 PSYTX W PT 45 MINUTES: CPT | Performed by: COUNSELOR

## 2023-08-23 NOTE — PSYCH
Virtual Regular Visit    Verification of patient location:    Patient is located at Home in the following state in which I hold an active license [place]      Assessment/Plan:    Problem List Items Addressed This Visit        Other    Bipolar 1 disorder, depressed, moderate ([address]) - Primary       Goals addressed in session: Goal 1 and Goal 2          Reason for visit is   Chief Complaint   Patient presents with   • Virtual Regular Visit        Encounter provider Juan David Zafar    Provider located at [address] [place] [address] [place] [id number] [phone]      Recent Visits  No visits were found meeting these conditions. Showing recent visits within past 7 days and meeting all other requirements  Today's Visits  Date Type Provider Dept   08/23/23 Telemedicine [address]   Showing today's visits and meeting all other requirements  Future Appointments  No visits were found meeting these conditions. Showing future appointments within next 150 days and meeting all other requirements       The patient was identified by name and date of birth. Brian Diana was informed that this is a telemedicine visit and [facility] She agrees to proceed. .  My office door was closed. No one else was in the room. She acknowledged consent and understanding of privacy and security of the video platform. The patient has agreed to participate and understands they can discontinue the visit at any time. Patient is aware this is a billable service. Subjective  Brian Diana is a 61 y.o. female  . HPI     No past medical history on file. No past surgical history on file. No current outpatient medications on file. No current facility-administered medications for this visit.         Allergies   Allergen Reactions   • Lithium Dizziness and Headache   • Depakote [Valproic Acid] Rash   • Geodon [Ziprasidone] Rash   • Lamictal [Lamotrigine] Rash       Review of Systems    Video Exam    There were no vitals filed for this visit. Physical Exam         Behavioral Health Psychotherapy Progress Note    Psychotherapy Provided: Individual Psychotherapy     1. Bipolar 1 disorder, depressed, moderate ([address])            Goals addressed in session: Goal 1 and Goal 2     DATA: Clinician met with Kirstie Morales via telehealth for individual therapy. Kirstie Morales processed recently being at home alone while her daughter and her family were away on vacation. She reports that she did not experience any panic during that time. Clinician explored strategies and coping skills she was able to use that made experience less anxiety provoking this time in compared to last time. Kirstie Morales processed dynamic with her daughter and working to set better boundaries. Clinician explored interactions and provided feedback. During this session, this clinician used the following therapeutic modalities: Engagement Strategies, Client-centered Therapy, Solution-Focused Therapy and Supportive Psychotherapy    Substance Abuse was not addressed during this session. If the client is diagnosed with a co-occurring substance use disorder, please indicate any changes in the frequency or amount of use: n/a. Stage of change for addressing substance use diagnoses: No substance use/Not applicable    ASSESSMENT:  Conor Fitzgerald presents with a Euthymic/ normal and Anxious mood. her affect is Normal range and intensity, which is congruent, with her mood and the content of the session. The client has made progress on their goals. Kirstie Morales was open and engaged in the session. Conor Fitzgerald presents with a none risk of suicide, none risk of self-harm, and none risk of harm to others. For any risk assessment that surpasses a "low" rating, a safety plan must be developed.     A safety plan was indicated: no  If yes, describe in detail n/a    PLAN: Between sessions, Jovanny Sagastume will work on healthy communication skills. At the next session, the therapist will use Engagement Strategies, Client-centered Therapy and Supportive Psychotherapy to address anxiety and depressive symptoms. Behavioral Health Treatment Plan and Discharge Planning: Jovanny Sagastume is aware of and agrees to continue to work on their treatment plan. They have identified and are working toward their discharge goals.  yes    Visit start and stop times:    08/23/23  Start Time: 1000  Stop Time: 1045  Total Visit Time: 45 minutes

## 2023-08-29 ENCOUNTER — TELEMEDICINE (OUTPATIENT)
Dept: BEHAVIORAL/MENTAL HEALTH CLINIC | Facility: CLINIC | Age: 63
End: 2023-08-29
Payer: COMMERCIAL

## 2023-08-29 DIAGNOSIS — F31.32 BIPOLAR 1 DISORDER, DEPRESSED, MODERATE (HCC): Primary | ICD-10-CM

## 2023-08-29 PROCEDURE — 90834 PSYTX W PT 45 MINUTES: CPT | Performed by: COUNSELOR

## 2023-08-29 NOTE — PSYCH
Virtual Regular Visit    Verification of patient location:    Patient is located at Home in the following state in which I hold an active license [place]      Assessment/Plan:    Problem List Items Addressed This Visit        Other    Bipolar 1 disorder, depressed, moderate ([address]) - Primary       Goals addressed in session: Goal 1 and Goal 2          Reason for visit is   Chief Complaint   Patient presents with   • Virtual Regular Visit          Encounter provider Juan David Zafar    Provider located at [address] [place] [id number] [phone]      Recent Visits  Date Type Provider Dept   08/23/23 [address]   Showing recent visits within past 7 days and meeting all other requirements  Today's Visits  Date Type Provider Dept   08/29/23 Telemedicine [address]   Showing today's visits and meeting all other requirements  Future Appointments  No visits were found meeting these conditions. Showing future appointments within next 150 days and meeting all other requirements       The patient was identified by name and date of birth. Brian Diana was informed that this is a telemedicine visit and [facility] She agrees to proceed. .  My office door was closed. No one else was in the room. She acknowledged consent and understanding of privacy and security of the video platform. The patient has agreed to participate and understands they can discontinue the visit at any time    Patient is aware this is a billable service. Subjective  Brian Diana is a 61 y.o. female       HPI     No past medical history on file. No past surgical history on file. No current outpatient medications on file. No current facility-administered medications for this visit. Allergies   Allergen Reactions   • Lithium Dizziness and Headache   • Depakote [Valproic Acid] Rash   • Geodon [Ziprasidone] Rash   • Lamictal [Lamotrigine] Rash       Review of Systems    Video Exam    There were no vitals filed for this visit. Physical Exam     Behavioral Health Psychotherapy Progress Note    Psychotherapy Provided: Individual Psychotherapy     1. Bipolar 1 disorder, depressed, moderate ([address])            Goals addressed in session: Goal 1 and Goal 2     DATA: Clinician met with Virgen Judd via telehealth for individual therapy. Virgen Judd processed recent interaction with her daughter and her children and continuing to work on healthy boundaries. Clinician discussed looking into more community involvement. Clinician discussed possibly getting involved with her Jehovah's witness or community groups to increase social support and engagement. During this session, this clinician used the following therapeutic modalities: Engagement Strategies, Client-centered Therapy, Solution-Focused Therapy and Supportive Psychotherapy    Substance Abuse was not addressed during this session. If the client is diagnosed with a co-occurring substance use disorder, please indicate any changes in the frequency or amount of use: n/a. Stage of change for addressing substance use diagnoses: No substance use/Not applicable    ASSESSMENT:  Annie Phelps presents with a Anxious mood. her affect is Normal range and intensity, which is congruent, with her mood and the content of the session. The client has made progress on their goals. Virgen Judd was open and engaged in the session. Annie Phelps presents with a none risk of suicide, none risk of self-harm, and none risk of harm to others. For any risk assessment that surpasses a "low" rating, a safety plan must be developed.     A safety plan was indicated: no  If yes, describe in detail n/a    PLAN: Between sessions, Annie Phelps will work on setting and maintaining boundaries. At the next session, the therapist will use Engagement Strategies, Client-centered Therapy and Supportive Psychotherapy to address anxiety. Behavioral Health Treatment Plan and Discharge Planning: Brenda Medina is aware of and agrees to continue to work on their treatment plan. They have identified and are working toward their discharge goals.  yes    Visit start and stop times:    08/29/23  Start Time: 0800  Stop Time: 0845  Total Visit Time: 45 minutes

## 2023-09-12 ENCOUNTER — TELEMEDICINE (OUTPATIENT)
Dept: BEHAVIORAL/MENTAL HEALTH CLINIC | Facility: CLINIC | Age: 63
End: 2023-09-12
Payer: COMMERCIAL

## 2023-09-12 DIAGNOSIS — F31.32 BIPOLAR 1 DISORDER, DEPRESSED, MODERATE (HCC): Primary | ICD-10-CM

## 2023-09-12 PROCEDURE — 90834 PSYTX W PT 45 MINUTES: CPT | Performed by: COUNSELOR

## 2023-09-12 NOTE — PSYCH
Virtual Regular Visit    Verification of patient location:    Patient is located at Home in the following state in which I hold an active license [place]      Assessment/Plan:    Problem List Items Addressed This Visit        Other    Bipolar 1 disorder, depressed, moderate ([address]) - Primary       Goals addressed in session: Goal 1          Reason for visit is   Chief Complaint   Patient presents with   • Virtual Regular Visit        Encounter provider Brian Lynn    Provider located at [address] [place] [id number] [phone]      Recent Visits  No visits were found meeting these conditions. Showing recent visits within past 7 days and meeting all other requirements  Today's Visits  Date Type Provider Dept   09/12/23 Telemedicine [address]   Showing today's visits and meeting all other requirements  Future Appointments  No visits were found meeting these conditions. Showing future appointments within next 150 days and meeting all other requirements       The patient was identified by name and date of birth. Nancy Sheppard was informed that this is a telemedicine visit and [facility] She agrees to proceed. .  My office door was closed. No one else was in the room. She acknowledged consent and understanding of privacy and security of the video platform. The patient has agreed to participate and understands they can discontinue the visit at any time. Patient is aware this is a billable service. Subjective  Nancy Sheppard is a 61 y.o. female  . HPI     No past medical history on file. No past surgical history on file. No current outpatient medications on file. No current facility-administered medications for this visit.         Allergies   Allergen Reactions   • Lithium Dizziness and Headache   • Depakote [Valproic Acid] Rash   • Geodon [Ziprasidone] Rash   • Lamictal [Lamotrigine] Rash       Review of Systems    Video Exam    There were no vitals filed for this visit. Physical Exam     Behavioral Health Psychotherapy Progress Note    Psychotherapy Provided: Individual Psychotherapy     1. Bipolar 1 disorder, depressed, moderate ([address])            Goals addressed in session: Goal 1 and Goal 2     DATA: Clinician met with Wilma Oneil via telehealth for individual therapy. Wilma Oneil process continues efforts to maintain boundaries with her daughter and progress made so far. She reports that she has not seen her daughter in about two weeks and has limited contact with her and the benefits of this. She also explored her mental health and cycling through different emotions which was noticed by her daughter initially noticed and address. She reports self awareness of cycles helps her to be able to stay focused while experiencing hypomanic symptoms. Clinician explored strategies she uses to refocus and manage symptoms. During this session, this clinician used the following therapeutic modalities: Engagement Strategies, Client-centered Therapy and Mindfulness-based Strategies    Substance Abuse was not addressed during this session. If the client is diagnosed with a co-occurring substance use disorder, please indicate any changes in the frequency or amount of use: n/a. Stage of change for addressing substance use diagnoses: No substance use/Not applicable    ASSESSMENT:  Vincenzo Dc presents with a Euthymic/ normal and Anxious mood. her affect is Normal range and intensity, which is congruent, with her mood and the content of the session. The client has made progress on their goals. Wilma Oneil was open and engaged in the session. Vincenzo Dc presents with a none risk of suicide, none risk of self-harm, and none risk of harm to others.     For any risk assessment that surpasses a "low" rating, a safety plan must be developed. A safety plan was indicated: no  If yes, describe in detail n/a    PLAN: Between sessions, Prakash Barragan will continued to utilize grounding techniques and mindfulness as needed. At the next session, the therapist will use Engagement Strategies, Client-centered Therapy and Solution-Focused Therapy to address depression and anxiety. Behavioral Health Treatment Plan and Discharge Planning: Prakash Barragan is aware of and agrees to continue to work on their treatment plan. They have identified and are working toward their discharge goals.  yes    Visit start and stop times:    09/12/23  Start Time: 0804  Stop Time: 9395  Total Visit Time: 45 minutes

## 2023-09-26 ENCOUNTER — TELEMEDICINE (OUTPATIENT)
Dept: BEHAVIORAL/MENTAL HEALTH CLINIC | Facility: CLINIC | Age: 63
End: 2023-09-26
Payer: COMMERCIAL

## 2023-09-26 DIAGNOSIS — F31.32 BIPOLAR 1 DISORDER, DEPRESSED, MODERATE (HCC): Primary | ICD-10-CM

## 2023-09-26 PROCEDURE — 90834 PSYTX W PT 45 MINUTES: CPT | Performed by: COUNSELOR

## 2023-09-26 NOTE — PSYCH
Virtual Regular Visit    Verification of patient location:    Patient is located at Home in the following state in which I hold an active license [place]      Assessment/Plan:    Problem List Items Addressed This Visit        Other    Bipolar 1 disorder, depressed, moderate ([address]) - Primary       Goals addressed in session: Goal 1 and Goal 2          Reason for visit is   Chief Complaint   Patient presents with   • Virtual Regular Visit          Encounter provider Marek Webb    Provider located at [address] [place] [id number] [phone]      Recent Visits  No visits were found meeting these conditions. Showing recent visits within past 7 days and meeting all other requirements  Today's Visits  Date Type Provider Dept   09/26/23 Telemedicine [address]   Showing today's visits and meeting all other requirements  Future Appointments  No visits were found meeting these conditions. Showing future appointments within next 150 days and meeting all other requirements       The patient was identified by name and date of birth. Marquis Fields was informed that this is a telemedicine visit and [facility] She agrees to proceed. .  My office door was closed. No one else was in the room. She acknowledged consent and understanding of privacy and security of the video platform. The patient has agreed to participate and understands they can discontinue the visit at any time. Patient is aware this is a billable service. Subjective  Marquis Fields is a 61 y.o. female  . HPI     No past medical history on file. No past surgical history on file. No current outpatient medications on file. No current facility-administered medications for this visit.         Allergies   Allergen Reactions   • Lithium Dizziness and Headache   • Depakote [Valproic Acid] Rash   • Geodon [Ziprasidone] Rash   • Lamictal [Lamotrigine] Rash       Review of Systems    Video Exam    There were no vitals filed for this visit. Physical Exam     Behavioral Health Psychotherapy Progress Note    Psychotherapy Provided: Individual Psychotherapy     1. Bipolar 1 disorder, depressed, moderate ([address])            Goals addressed in session: Goal 1 and Goal 2     DATA: Clinician met with Alfredo Yañez via telehealth for individual therapy. Alfredo Yañez processed recently attending a social group for a dancing class that she plans to attend for the next few weeks. She discussed feeling anxious and uncomfortable at the beginning but as the class went on she felt better and got to socialize with others. She discussed other social activities that she would like to engage in the future and the benefits. Clinician checked in with current boundaries with her daughter and progress made with more healthy communication of emotions. During this session, this clinician used the following therapeutic modalities: Engagement Strategies, Client-centered Therapy, Solution-Focused Therapy and Supportive Psychotherapy    Substance Abuse was not addressed during this session. If the client is diagnosed with a co-occurring substance use disorder, please indicate any changes in the frequency or amount of use: n/a. Stage of change for addressing substance use diagnoses: No substance use/Not applicable    ASSESSMENT:  Jerri Claros presents with a Euthymic/ normal mood. her affect is Normal range and intensity, which is congruent, with her mood and the content of the session. The client has made progress on their goals. Alfredo Yañez was open and engaged in the session. Jerri Claros presents with a none risk of suicide, none risk of self-harm, and none risk of harm to others. For any risk assessment that surpasses a "low" rating, a safety plan must be developed.     A safety plan was indicated: no  If yes, describe in detail n/a    PLAN: Between sessions, Karey De Luna will continue to engage in the community in a meaningful way. At the next session, the therapist will use Engagement Strategies, Client-centered Therapy and Supportive Psychotherapy to address anxiety and depressive symptoms. Behavioral Health Treatment Plan and Discharge Planning: Karey De Luna is aware of and agrees to continue to work on their treatment plan. They have identified and are working toward their discharge goals.  yes    Visit start and stop times:    09/26/23  Start Time: 0800  Stop Time: 0845  Total Visit Time: 45 minutes

## 2023-10-10 ENCOUNTER — TELEMEDICINE (OUTPATIENT)
Dept: BEHAVIORAL/MENTAL HEALTH CLINIC | Facility: CLINIC | Age: 63
End: 2023-10-10
Payer: COMMERCIAL

## 2023-10-10 DIAGNOSIS — F31.32 BIPOLAR 1 DISORDER, DEPRESSED, MODERATE (HCC): Primary | ICD-10-CM

## 2023-10-10 PROCEDURE — 90834 PSYTX W PT 45 MINUTES: CPT | Performed by: COUNSELOR

## 2023-10-10 NOTE — PSYCH
Virtual Regular Visit    Verification of patient location:    Patient is located at Home in the following state in which I hold an active license [place]      Assessment/Plan:    Problem List Items Addressed This Visit        Other    Bipolar 1 disorder, depressed, moderate ([address]) - Primary       Goals addressed in session: Goal 1 and Goal 2          Reason for visit is   Chief Complaint   Patient presents with   • Virtual Regular Visit        Encounter provider Jennifer Blandon    Provider located at [address] [place] [address] [address] [place] [id number] [phone]      Recent Visits  No visits were found meeting these conditions. Showing recent visits within past 7 days and meeting all other requirements  Today's Visits  Date Type Provider Dept   10/10/23 Telemedicine [address]   Showing today's visits and meeting all other requirements  Future Appointments  No visits were found meeting these conditions. Showing future appointments within next 150 days and meeting all other requirements       The patient was identified by name and date of birth. Silverio Woodard was informed that this is a telemedicine visit and [facility] She agrees to proceed. .  My office door was closed. No one else was in the room. She acknowledged consent and understanding of privacy and security of the video platform. The patient has agreed to participate and understands they can discontinue the visit at any time. Patient is aware this is a billable service. Subjective  Silverio Woodard is a 61 y.o. female  . HPI     No past medical history on file. No past surgical history on file. No current outpatient medications on file. No current facility-administered medications for this visit.         Allergies   Allergen Reactions   • Lithium Dizziness and Headache   • Depakote [Valproic Acid] Rash   • Geodon [Ziprasidone] Rash   • Lamictal [Lamotrigine] Rash       Review of Systems    Video Exam    There were no vitals filed for this visit. Physical Exam     Behavioral Health Psychotherapy Progress Note    Psychotherapy Provided: Individual Psychotherapy     1. Bipolar 1 disorder, depressed, moderate ([address])            Goals addressed in session: Goal 1 and Goal 2     DATA: Clinician met with Chrissy Farnsworth via telehealth for individual therapy. Chrissy Farnsworth discussed community resources that she has engaged in and the benefits. She processed communication with adult daughter and setting boundaries with her as often as possible. She reports that she was able to tell her daughter 'no' and stay home for a weekend rather than go see her. She processed attempts to encourage her daughter to increase independence and social support. During this session, this clinician used the following therapeutic modalities: Engagement Strategies, Client-centered Therapy and Supportive Psychotherapy    Substance Abuse was not addressed during this session. If the client is diagnosed with a co-occurring substance use disorder, please indicate any changes in the frequency or amount of use: n/a. Stage of change for addressing substance use diagnoses: No substance use/Not applicable    ASSESSMENT:  Juan Paredes presents with a Euthymic/ normal and Anxious mood. her affect is Normal range and intensity, which is congruent, with her mood and the content of the session. The client has made progress on their goals. Chrissy Farnsworth was open and engaged in the session. Juan Paredes presents with a none risk of suicide, none risk of self-harm, and none risk of harm to others. For any risk assessment that surpasses a "low" rating, a safety plan must be developed.     A safety plan was indicated: no  If yes, describe in detail n/a    PLAN: Between sessions, Juan Paredes will utilize community resources and social support. At the next session, the therapist will use Engagement Strategies, Client-centered Therapy and Supportive Psychotherapy to address anxiety and depression. .    Behavioral Health Treatment Plan and Discharge Planning: Ghazal Charlotte is aware of and agrees to continue to work on their treatment plan. They have identified and are working toward their discharge goals.  yes    Visit start and stop times:    10/10/23  Start Time: 0808  Stop Time: 5641  Total Visit Time: 41 minutes

## 2023-10-10 NOTE — BH TREATMENT PLAN
Outpatient Behavioral Health Psychotherapy Treatment Plan     Jerri Claros  1960      Date of Initial Psychotherapy Assessment: 8/31/21   Date of Current Treatment Plan: 10/10/23  Treatment Plan Target Date: 3/7/24  Treatment Plan Expiration Date: 4/7/24     Diagnosis:   1. Bipolar 1 disorder, depressed, moderate ([facility])                Area(s) of Need: Setting boundaries, healthy communication, increased engagement in community/person hobbies.     Long Term Goal 1 (in the client's own words): set boundaries with daughter     Stage of Change: Action     Target Date for completion: 4/7/24             Anticipated therapeutic modalities: Client Centered, Solution Focused, CBT             People identified to complete this goals: Alfredo Siddiqui             Objective 1: (identify the means of measuring success in meeting the objective): Learn to assertively communicate needs and boundaries to adult daughter                    Objective 2: (identify the means of measuring success in meeting the objective): Say 'no' to daughter to at least one event per month      Long Term Goal 2 (in the client's own words): decrease depressive symptoms     Stage of Change: Action     Target Date for completion: 4/7/24             Anticipated therapeutic modalities: Client Centered, supportive psychotherapy, CBT             People identified to complete this goal: Alfredo Siddiqui                    Objective 1: (identify the means of measuring success in meeting the objective): Teach and practice healthy coping skills in order to decrease depressive symptoms                    Objective 2: (identify the means of measuring success in meeting the objective):  Increase engagement in the community to at least one event per month outside of her home.        I am currently under the care of a [facility] psychiatric provider: no     My . [facility] psychiatric provider is: n/a  Dr Chris Atkins am currently taking psychiatric medications: Yes, as prescribed     I feel that I will be ready for discharge from mental health care when I reach the following (measurable goal/objective): Be able to say no without guilt when my daughter asked me to do something I do not want to do.     For children and adults who have a legal guardian:          Has there been any change to custody orders and/or guardianship status? NA. If yes, attach updated documentation.     I have updated my Crisis Plan and have been offered a copy of this plan     [address]: Diagnosis and Treatment Plan explained to Dejah Guevara acknowledges an understanding of their diagnosis. Conor Fitzgerald agrees to this treatment plan.     I have been offered a copy of this Treatment Plan. Yes    Conor Leerodney, 1960, actively participated in the review and update of this treatment plan during a virtual session, using the Epic Embedded platform. Conor Fitzgerald  provided verbal consent on 10/10/2023 at 8:35 AM. The treatment plan was transcribed into the Electronic Health Record at a later time.

## 2023-10-24 ENCOUNTER — TELEPHONE (OUTPATIENT)
Dept: PSYCHIATRY | Facility: CLINIC | Age: 63
End: 2023-10-24

## 2023-10-24 ENCOUNTER — TELEMEDICINE (OUTPATIENT)
Dept: BEHAVIORAL/MENTAL HEALTH CLINIC | Facility: CLINIC | Age: 63
End: 2023-10-24
Payer: COMMERCIAL

## 2023-10-24 DIAGNOSIS — F31.32 BIPOLAR 1 DISORDER, DEPRESSED, MODERATE (HCC): Primary | ICD-10-CM

## 2023-10-24 PROCEDURE — 90834 PSYTX W PT 45 MINUTES: CPT | Performed by: COUNSELOR

## 2023-10-24 NOTE — PSYCH
Virtual Regular Visit    Verification of patient location:    Patient is located at Home in the following state in which I hold an active license [place]      Assessment/Plan:    Problem List Items Addressed This Visit        Other    Bipolar 1 disorder, depressed, moderate ([address]) - Primary       Goals addressed in session: Goal 1 and Goal 2          Reason for visit is No chief complaint on file. Encounter provider Jamaal Miramontes    Provider located at [address] [place] [id number] [phone]      Recent Visits  No visits were found meeting these conditions. Showing recent visits within past 7 days and meeting all other requirements  Today's Visits  Date Type Provider Dept   10/24/23 Telemedicine [address]   Showing today's visits and meeting all other requirements  Future Appointments  No visits were found meeting these conditions. Showing future appointments within next 150 days and meeting all other requirements       The patient was identified by name and date of birth. Mague Velasquez was informed that this is a telemedicine visit and [facility] She agrees to proceed. .  My office door was closed. No one else was in the room. She acknowledged consent and understanding of privacy and security of the video platform. The patient has agreed to participate and understands they can discontinue the visit at any time. Patient is aware this is a billable service. Subjective  Mague Velasquez is a 61 y.o. female  . HPI     No past medical history on file. No past surgical history on file. No current outpatient medications on file. No current facility-administered medications for this visit.         Allergies   Allergen Reactions   • Lithium Dizziness and Headache   • Depakote [Valproic Acid] Rash   • Geodon [Ziprasidone] Rash   • Lamictal [Lamotrigine] Rash       Review of Systems    Video Exam    There were no vitals filed for this visit. Physical Exam     Behavioral Health Psychotherapy Progress Note    Psychotherapy Provided: Individual Psychotherapy     1. Bipolar 1 disorder, depressed, moderate ([address])            Goals addressed in session: Goal 1 and Goal 2     DATA: Clinician met with Matthew Spaulding via telehealth for individual therapy. Matthew Spaulding reports that she has continued to work to be more engaged in the community. She is attending a weekly dance class and has been attending groups at [employer] as well. She reports benefits in getting out and doing things for herself that does not involve other family and gaining independence. She reports an increase in mood and feeling less depressive symptoms. Clinician encouraged follow through with healthy communication, boundaries and self care. During this session, this clinician used the following therapeutic modalities: Engagement Strategies, Client-centered Therapy, and Supportive Psychotherapy    Substance Abuse was not addressed during this session. If the client is diagnosed with a co-occurring substance use disorder, please indicate any changes in the frequency or amount of use: n/a. Stage of change for addressing substance use diagnoses: No substance use/Not applicable    ASSESSMENT:  Silverio Woodard presents with a Anxious mood. her affect is Normal range and intensity, which is congruent, with her mood and the content of the session. The client has made progress on their goals. Matthew Spaulding was open and engaged in the session. Silverio Woodard presents with a none risk of suicide, none risk of self-harm, and none risk of harm to others. For any risk assessment that surpasses a "low" rating, a safety plan must be developed.     A safety plan was indicated: no  If yes, describe in detail n/a    PLAN: Between sessions, Silverio Woodard will continue to follow through with healthy habits. At the next session, the therapist will use Client-centered Therapy, Mindfulness-based Strategies, and Solution-Focused Therapy to address depressive symptoms. Behavioral Health Treatment Plan and Discharge Planning: Ghazal Porter is aware of and agrees to continue to work on their treatment plan. They have identified and are working toward their discharge goals.  yes    Visit start and stop times:    10/24/23  Start Time: 0800  Stop Time: 0845  Total Visit Time: 45 minutes

## 2023-10-24 NOTE — TELEPHONE ENCOUNTER
Patient is calling regarding cancelling an appointment.     Date/Time: 11/7/2023 8am    Reason:     Patient was rescheduled: YES [x] NO []  If yes, when was Patient reschedule for: 11/7/2023 1pm    Patient requesting call back to reschedule: YES [] NO [x]

## 2023-11-07 ENCOUNTER — TELEPHONE (OUTPATIENT)
Dept: PSYCHIATRY | Facility: CLINIC | Age: 63
End: 2023-11-07

## 2023-11-07 NOTE — TELEPHONE ENCOUNTER
Writer LITTLE to inform patient today's appointment has been cancelled due to provider being out of office.  Next scheduled apt is [email or web address]

## 2023-11-21 ENCOUNTER — TELEMEDICINE (OUTPATIENT)
Dept: BEHAVIORAL/MENTAL HEALTH CLINIC | Facility: CLINIC | Age: 63
End: 2023-11-21
Payer: COMMERCIAL

## 2023-11-21 DIAGNOSIS — F31.32 BIPOLAR 1 DISORDER, DEPRESSED, MODERATE (HCC): Primary | ICD-10-CM

## 2023-11-21 PROCEDURE — 90834 PSYTX W PT 45 MINUTES: CPT | Performed by: COUNSELOR

## 2023-11-21 NOTE — PSYCH
Virtual Regular Visit    Verification of patient location:    Patient is located at Home in the following state in which I hold an active license [place]      Assessment/Plan:    Problem List Items Addressed This Visit     Bipolar 1 disorder, depressed, moderate ([address]) - Primary       Goals addressed in session: Goal 1 and Goal 2          Reason for visit is   Chief Complaint   Patient presents with   • Virtual Regular Visit          Encounter provider Lucia Baron    Provider located at [address] [place] [address] [address] [place] [id number] [phone]      Recent Visits  No visits were found meeting these conditions. Showing recent visits within past 7 days and meeting all other requirements  Today's Visits  Date Type Provider Dept   11/21/23 Telemedicine [address]   Showing today's visits and meeting all other requirements  Future Appointments  No visits were found meeting these conditions. Showing future appointments within next 150 days and meeting all other requirements       The patient was identified by name and date of birth. Jovanny Sagastume was informed that this is a telemedicine visit and [facility] She agrees to proceed. .  My office door was closed. No one else was in the room. She acknowledged consent and understanding of privacy and security of the video platform. The patient has agreed to participate and understands they can discontinue the visit at any time. Patient is aware this is a billable service. Subjective  Jovanny Sagastume is a 61 y.o. female  . HPI     No past medical history on file. No past surgical history on file. No current outpatient medications on file. No current facility-administered medications for this visit.         Allergies   Allergen Reactions   • Lithium Dizziness and Headache   • Depakote [Valproic Acid] Rash   • Geodon [Ziprasidone] Rash   • Lamictal [Lamotrigine] Rash       Review of Systems    Video Exam    There were no vitals filed for this visit. Physical Exam     Behavioral Health Psychotherapy Progress Note    Psychotherapy Provided: Individual Psychotherapy     1. Bipolar 1 disorder, depressed, moderate ([address])            Goals addressed in session: Goal 1 and Goal 2     DATA: Clinician met with Iris Kilpatrick via telehealth for individual therapy. Iris Kilpatrick discussed following through with line dancing weekly. She reports that the class has been going well and she is getting know others in her class. She also reports that she has gone to the [facility] bipolar support group. She reports that it was beneficial and she plans to continue to go in the future. She reports the only down side was the distance from her home. Clinician praised follow through with engaging in the community more. During this session, this clinician used the following therapeutic modalities: Engagement Strategies, Client-centered Therapy, and Supportive Psychotherapy    Substance Abuse was not addressed during this session. If the client is diagnosed with a co-occurring substance use disorder, please indicate any changes in the frequency or amount of use: n/a. Stage of change for addressing substance use diagnoses: No substance use/Not applicable    ASSESSMENT:  Heron Cabrera presents with a Euthymic/ normal mood. her affect is Normal range and intensity, which is congruent, with her mood and the content of the session. The client has made progress on their goals. Iris Kilpatrick was open and engaged in the session. Heron Cabrera presents with a none risk of suicide, none risk of self-harm, and none risk of harm to others. For any risk assessment that surpasses a "low" rating, a safety plan must be developed.     A safety plan was indicated: no  If yes, describe in detail n/a    PLAN: Between sessions, [employer] Kathleen Katlin will continue to engage in the community in a meaningful way. At the next session, the therapist will use Engagement Strategies, Client-centered Therapy, and Supportive Psychotherapy to address anxiety and depressive symptoms. Behavioral Health Treatment Plan and Discharge Planning: Eduard Boggs is aware of and agrees to continue to work on their treatment plan. They have identified and are working toward their discharge goals.  yes    Visit start and stop times:    11/21/23  Start Time: 0806  Stop Time: 2073  Total Visit Time: 43 minutes

## 2023-12-05 ENCOUNTER — TELEMEDICINE (OUTPATIENT)
Dept: BEHAVIORAL/MENTAL HEALTH CLINIC | Facility: CLINIC | Age: 63
End: 2023-12-05
Payer: COMMERCIAL

## 2023-12-05 DIAGNOSIS — F31.32 BIPOLAR 1 DISORDER, DEPRESSED, MODERATE (HCC): Primary | ICD-10-CM

## 2023-12-05 PROCEDURE — 90834 PSYTX W PT 45 MINUTES: CPT | Performed by: COUNSELOR

## 2023-12-05 NOTE — PSYCH
Virtual Regular Visit    Verification of patient location:    Patient is located at Home in the following state in which I hold an active license [place]      Assessment/Plan:    Problem List Items Addressed This Visit     Bipolar 1 disorder, depressed, moderate ([address]) - Primary       Goals addressed in session: Goal 1 and Goal 2          Reason for visit is No chief complaint on file. Encounter provider Genia Ibrahim    Provider located at [address] [place] [id number] [phone]      Recent Visits  No visits were found meeting these conditions. Showing recent visits within past 7 days and meeting all other requirements  Today's Visits  Date Type Provider Dept   12/05/23 Telemedicine [address]   Showing today's visits and meeting all other requirements  Future Appointments  No visits were found meeting these conditions. Showing future appointments within next 150 days and meeting all other requirements       The patient was identified by name and date of birth. Belma Goldmann was informed that this is a telemedicine visit and [facility] She agrees to proceed. .  My office door was closed. No one else was in the room. She acknowledged consent and understanding of privacy and security of the video platform. The patient has agreed to participate and understands they can discontinue the visit at any time. Patient is aware this is a billable service. Subjective  Belma Goldmann is a 61 y.o. female  . HPI     No past medical history on file. No past surgical history on file. No current outpatient medications on file. No current facility-administered medications for this visit.         Allergies   Allergen Reactions   • Lithium Dizziness and Headache   • Depakote [Valproic Acid] Rash   • Geodon [Ziprasidone] Rash   • Lamictal [Lamotrigine] Rash       Review of Systems    Video Exam    There were no vitals filed for this visit. Physical Exam     Behavioral Health Psychotherapy Progress Note    Psychotherapy Provided: Individual Psychotherapy     1. Bipolar 1 disorder, depressed, moderate ([address])            Goals addressed in session: Goal 1 and Goal 2     DATA: Clinician met with Mariajose Marianela via telehealth for individual therapy. Mariajose Bear processed follow through with coping skills. She detailed currently weekly line dancing and the benefits of getting to meet new people and exercise. She discussed continuing to work on managing relationships with her children and grandchildren. She reports working to set and maintain boundaries with adult daughter and is feeling good about her progress. Clinician checked in with use of coping skills and encouraged physical activity during winter months. During this session, this clinician used the following therapeutic modalities: Client-centered Therapy, Solution-Focused Therapy, and Supportive Psychotherapy    Substance Abuse was not addressed during this session. If the client is diagnosed with a co-occurring substance use disorder, please indicate any changes in the frequency or amount of use: n/a. Stage of change for addressing substance use diagnoses: No substance use/Not applicable    ASSESSMENT:  Belma Goldmann presents with a Anxious and Depressed mood. her affect is Constricted, which is congruent, with her mood and the content of the session. The client has made progress on their goals. Mariajose Bear was open and engaged in the session. Belma Goldmann presents with a none risk of suicide, none risk of self-harm, and none risk of harm to others. For any risk assessment that surpasses a "low" rating, a safety plan must be developed.     A safety plan was indicated: no  If yes, describe in detail n/a    PLAN: Between sessions, Belma Goldmann will continue to follow through with coping skills. At the next session, the therapist will use Client-centered Therapy, Solution-Focused Therapy, and Supportive Psychotherapy to address depressive symptomsend . Behavioral Health Treatment Plan and Discharge Planning: Lee Lambert is aware of and agrees to continue to work on their treatment plan. They have identified and are working toward their discharge goals.  yes    Visit start and stop times:    12/05/23  Start Time: 0807  Stop Time: 3470  Total Visit Time: 40 minutes

## 2023-12-19 ENCOUNTER — TELEMEDICINE (OUTPATIENT)
Dept: BEHAVIORAL/MENTAL HEALTH CLINIC | Facility: CLINIC | Age: 63
End: 2023-12-19
Payer: COMMERCIAL

## 2023-12-19 DIAGNOSIS — F31.32 BIPOLAR 1 DISORDER, DEPRESSED, MODERATE (HCC): Primary | ICD-10-CM

## 2023-12-19 PROCEDURE — 90834 PSYTX W PT 45 MINUTES: CPT | Performed by: COUNSELOR

## 2023-12-19 NOTE — PSYCH
Virtual Regular Visit    Verification of patient location:    Patient is located at Home in the following state in which I hold an active license [place]      Assessment/Plan:    Problem List Items Addressed This Visit     Bipolar 1 disorder, depressed, moderate ([facility]) - Primary       Goals addressed in session: Goal 1 and Goal 2          Reason for visit is   Chief Complaint   Patient presents with   • Virtual Regular Visit          Encounter provider Marylu Castrejon    Provider located at [facility]  [facility] [place]  [address] [address] [phone]      Recent Visits  No visits were found meeting these conditions.  Showing recent visits within past 7 days and meeting all other requirements  Today's Visits  Date Type Provider Dept   12/19/23 Telemedicine Marylu Castrejon [facility] [place]   Showing today's visits and meeting all other requirements  Future Appointments  No visits were found meeting these conditions.  Showing future appointments within next 150 days and meeting all other requirements       The patient was identified by name and date of birth. Almita Julio was informed that this is a telemedicine visit and that the visit is being conducted throughthe Epic Embedded platform. She agrees to proceed..  My office door was closed. No one else was in the room.  She acknowledged consent and understanding of privacy and security of the video platform. The patient has agreed to participate and understands they can discontinue the visit at any time.    Patient is aware this is a billable service.     Subjective  Almita Julio is a 63 y.o. female  .      HPI     No past medical history on file.    No past surgical history on file.    No current outpatient medications on file.     No current facility-administered medications for this visit.        Allergies   Allergen Reactions   • Lithium Dizziness and  "Headache   • Depakote [Valproic Acid] Rash   • Geodon [Ziprasidone] Rash   • Lamictal [Lamotrigine] Rash       Review of Systems    Video Exam    There were no vitals filed for this visit.    Physical Exam     Behavioral Health Psychotherapy Progress Note    Psychotherapy Provided: Individual Psychotherapy     1. Bipolar 1 disorder, depressed, moderate (HCC)            Goals addressed in session: Goal 1     DATA: Clinician met with Katherine via telehealth for individual therapy. Katherine processed conflict with daughter and desire to work on increasing social support so that she can increase her support outside of her family. Clinician explored communication and ways to increase open communication of feelings to daughter and her family. Clinician checked in with Katherine about community resources and boundaries with adult children. She dicussed possibility of getting on the housing list again and future planning.  During this session, this clinician used the following therapeutic modalities: Client-centered Therapy, Solution-Focused Therapy, and Supportive Psychotherapy    Substance Abuse was not addressed during this session. If the client is diagnosed with a co-occurring substance use disorder, please indicate any changes in the frequency or amount of use: n/a. Stage of change for addressing substance use diagnoses: No substance use/Not applicable    ASSESSMENT:  Almita Julio presents with a Euthymic/ normal and Anxious mood.     her affect is Normal range and intensity, which is congruent, with her mood and the content of the session. The client has made progress on their goals.    Katherine was open and engaged in the session. Almita Julio presents with a none risk of suicide, none risk of self-harm, and none risk of harm to others.    For any risk assessment that surpasses a \"low\" rating, a safety plan must be developed.    A safety plan was indicated: no  If yes, describe in detail n/a    PLAN: Between sessions, " Almita Julio will Continued to engage in the community in a meaningful way. At the next session, the therapist will use Client-centered Therapy and Supportive Psychotherapy to address anxiety.    Behavioral Health Treatment Plan and Discharge Planning: Almita Julio is aware of and agrees to continue to work on their treatment plan. They have identified and are working toward their discharge goals. yes    Visit start and stop times:    12/19/23  Start Time: 0810  Stop Time: 0855  Total Visit Time: 45 minutes

## 2024-01-03 ENCOUNTER — TELEMEDICINE (OUTPATIENT)
Dept: BEHAVIORAL/MENTAL HEALTH CLINIC | Facility: CLINIC | Age: 64
End: 2024-01-03
Payer: COMMERCIAL

## 2024-01-03 DIAGNOSIS — F31.32 BIPOLAR 1 DISORDER, DEPRESSED, MODERATE (HCC): Primary | ICD-10-CM

## 2024-01-03 PROCEDURE — 90834 PSYTX W PT 45 MINUTES: CPT | Performed by: COUNSELOR

## 2024-01-03 NOTE — PSYCH
Virtual Regular Visit    Verification of patient location:    Patient is located at Home in the following state in which I hold an active license [place]      Assessment/Plan:    Problem List Items Addressed This Visit     Bipolar 1 disorder, depressed, moderate (HCC) - Primary       Goals addressed in session: Goal 1 and Goal 2          Reason for visit is   Chief Complaint   Patient presents with   • Virtual Regular Visit          Encounter provider Marylu Castrejon    Provider located at [facility]  [facility] [place]  [address] [address] [phone]      Recent Visits  No visits were found meeting these conditions.  Showing recent visits within past 7 days and meeting all other requirements  Today's Visits  Date Type Provider Dept   01/03/24 Telemedicine Marylu Castrejon [facility] [place]   Showing today's visits and meeting all other requirements  Future Appointments  No visits were found meeting these conditions.  Showing future appointments within next 150 days and meeting all other requirements       The patient was identified by name and date of birth. Almita Julio was informed that this is a telemedicine visit and that the visit is being conducted throughthe Epic Embedded platform. She agrees to proceed..  My office door was closed. No one else was in the room.  She acknowledged consent and understanding of privacy and security of the video platform. The patient has agreed to participate and understands they can discontinue the visit at any time.    Patient is aware this is a billable service.     Subjective  Almita Julio is a 63 y.o. female  .      HPI     No past medical history on file.    No past surgical history on file.    No current outpatient medications on file.     No current facility-administered medications for this visit.        Allergies   Allergen Reactions   • Lithium Dizziness and  "Headache   • Depakote [Valproic Acid] Rash   • Geodon [Ziprasidone] Rash   • Lamictal [Lamotrigine] Rash       Review of Systems    Video Exam    There were no vitals filed for this visit.    Physical Exam     Behavioral Health Psychotherapy Progress Note    Psychotherapy Provided: Individual Psychotherapy     1. Bipolar 1 disorder, depressed, moderate (HCC)            Goals addressed in session: Goal 1 and Goal 2     DATA: Clinician met with Katherine via telehealth for individual therapy. Katherine processed stress related to sustaining boundaries and being to be assertive with her and be able to say 'no' more often. She reports feeling like her daughter has begun to rely more on her as her daughter gets further along in her pregnancy.  During this session, this clinician used the following therapeutic modalities: Client-centered Therapy and Supportive Psychotherapy    Substance Abuse was not addressed during this session. If the client is diagnosed with a co-occurring substance use disorder, please indicate any changes in the frequency or amount of use: n/a. Stage of change for addressing substance use diagnoses: No substance use/Not applicable    ASSESSMENT:  Almita Julio presents with a Euthymic/ normal mood.     her affect is Normal range and intensity, which is congruent, with her mood and the content of the session. The client has made progress on their goals.    Katherine was open and engaged in the session.  Almita Julio presents with a none risk of suicide, none risk of self-harm, and none risk of harm to others.    For any risk assessment that surpasses a \"low\" rating, a safety plan must be developed.    A safety plan was indicated: no  If yes, describe in detail n/a    PLAN: Between sessions, Almita Julio will utilize healthy coping skills. At the next session, the therapist will use Client-centered Therapy and Supportive Psychotherapy to address anxiety.    Behavioral Health Treatment Plan and Discharge " Planning: Almita Julio is aware of and agrees to continue to work on their treatment plan. They have identified and are working toward their discharge goals. yes    Visit start and stop times:    01/03/24  Start Time: 0905  Stop Time: 0949  Total Visit Time: 44 minutes

## 2024-01-16 ENCOUNTER — TELEMEDICINE (OUTPATIENT)
Dept: BEHAVIORAL/MENTAL HEALTH CLINIC | Facility: CLINIC | Age: 64
End: 2024-01-16
Payer: COMMERCIAL

## 2024-01-16 DIAGNOSIS — F31.32 BIPOLAR 1 DISORDER, DEPRESSED, MODERATE (HCC): Primary | ICD-10-CM

## 2024-01-16 PROCEDURE — 90834 PSYTX W PT 45 MINUTES: CPT | Performed by: COUNSELOR

## 2024-01-16 NOTE — PSYCH
Virtual Regular Visit    Verification of patient location:    Patient is located at Home in the following state in which I hold an active license [place]      Assessment/Plan:    Problem List Items Addressed This Visit     Bipolar 1 disorder, depressed, moderate (HCC) - Primary       Goals addressed in session: Goal 1          Reason for visit is   Chief Complaint   Patient presents with   • Virtual Regular Visit          Encounter provider Marylu Castrejon    Provider located at [facility] [place]  [facility] [place]  [address]  [place] [address] [phone]      Recent Visits  No visits were found meeting these conditions.  Showing recent visits within past 7 days and meeting all other requirements  Today's Visits  Date Type Provider Dept   01/16/24 Telemedicine Marylu Castrejon [facility] [place]   Showing today's visits and meeting all other requirements  Future Appointments  No visits were found meeting these conditions.  Showing future appointments within next 150 days and meeting all other requirements       The patient was identified by name and date of birth. Almita Julio was informed that this is a telemedicine visit and that the visit is being conducted throughthe Epic Embedded platform. She agrees to proceed..  My office door was closed. No one else was in the room.  She acknowledged consent and understanding of privacy and security of the video platform. The patient has agreed to participate and understands they can discontinue the visit at any time.    Patient is aware this is a billable service.     Subjective  Almita Julio is a 63 y.o. female  .      HPI     No past medical history on file.    No past surgical history on file.    No current outpatient medications on file.     No current facility-administered medications for this visit.        Allergies   Allergen Reactions   • Lithium Dizziness and Headache   •  "Depakote [Valproic Acid] Rash   • Geodon [Ziprasidone] Rash   • Lamictal [Lamotrigine] Rash       Review of Systems    Video Exam    There were no vitals filed for this visit.    Physical Exam     Behavioral Health Psychotherapy Progress Note    Psychotherapy Provided: Individual Psychotherapy     1. Bipolar 1 disorder, depressed, moderate (HCC)            Goals addressed in session: Goal 1 and Goal 2     DATA: Clinician met with Katherine via telehealth for individual therapy. Katherine processed interactions with daughter and continued efforts to communicate boundaries. She discussed changes she would like to make and ways to communicate this to daughter and her fears that he daughter will cut her out of her life. Clinician challenged cognitive distortion and explored assertiveness and communicate needs.   During this session, this clinician used the following therapeutic modalities: Client-centered Therapy and Supportive Psychotherapy    Substance Abuse was not addressed during this session. If the client is diagnosed with a co-occurring substance use disorder, please indicate any changes in the frequency or amount of use: n/a. Stage of change for addressing substance use diagnoses: No substance use/Not applicable    ASSESSMENT:  Almita Julio presents with a Euthymic/ normal and Anxious mood.     her affect is Normal range and intensity, which is congruent, with her mood and the content of the session. The client has made progress on their goals.    Katherine was open and engaged in the session.  Almita Julio presents with a none risk of suicide, none risk of self-harm, and none risk of harm to others.    For any risk assessment that surpasses a \"low\" rating, a safety plan must be developed.    A safety plan was indicated: no  If yes, describe in detail n/a    PLAN: Between sessions, Almita Julio will utilize assertive communication and boundaries. At the next session, the therapist will use Client-centered Therapy " and Supportive Psychotherapy to address anxiety.    Behavioral Health Treatment Plan and Discharge Planning: Almita Julio is aware of and agrees to continue to work on their treatment plan. They have identified and are working toward their discharge goals. yes    Visit start and stop times:    01/16/24  Start Time: 0803  Stop Time: 0847  Total Visit Time: 44 minutes

## 2024-01-30 ENCOUNTER — TELEMEDICINE (OUTPATIENT)
Dept: BEHAVIORAL/MENTAL HEALTH CLINIC | Facility: CLINIC | Age: 64
End: 2024-01-30
Payer: COMMERCIAL

## 2024-01-30 DIAGNOSIS — F31.32 BIPOLAR 1 DISORDER, DEPRESSED, MODERATE (HCC): Primary | ICD-10-CM

## 2024-01-30 PROCEDURE — 90834 PSYTX W PT 45 MINUTES: CPT | Performed by: COUNSELOR

## 2024-01-30 NOTE — PSYCH
Virtual Regular Visit    Verification of patient location:    Patient is located at Home in the following state in which I hold an active license [place]      Assessment/Plan:    Problem List Items Addressed This Visit     Bipolar 1 disorder, depressed, moderate (HCC) - Primary       Goals addressed in session: Goal 1 and Goal 2          Reason for visit is   Chief Complaint   Patient presents with   • Virtual Regular Visit        Encounter provider Marylu Castrejon    Provider located at [facility]  [facility] [place]  [address] [address] [phone]      Recent Visits  No visits were found meeting these conditions.  Showing recent visits within past 7 days and meeting all other requirements  Today's Visits  Date Type Provider Dept   01/30/24 Telemedicine [facility] [place]   Showing today's visits and meeting all other requirements  Future Appointments  No visits were found meeting these conditions.  Showing future appointments within next 150 days and meeting all other requirements       The patient was identified by name and date of birth. Almita Julio was informed that this is a telemedicine visit and that the visit is being conducted throughthe Epic Embedded platform. She agrees to proceed..  My office door was closed. No one else was in the room.  She acknowledged consent and understanding of privacy and security of the video platform. The patient has agreed to participate and understands they can discontinue the visit at any time.    Patient is aware this is a billable service.     Subjective  Almita Julio is a 63 y.o. female  .      HPI     No past medical history on file.    No past surgical history on file.    No current outpatient medications on file.     No current facility-administered medications for this visit.        Allergies   Allergen Reactions   • Lithium Dizziness and  "Headache   • Depakote [Valproic Acid] Rash   • Geodon [Ziprasidone] Rash   • Lamictal [Lamotrigine] Rash       Review of Systems    Video Exam    There were no vitals filed for this visit.    Physical Exam     Behavioral Health Psychotherapy Progress Note    Psychotherapy Provided: Individual Psychotherapy     1. Bipolar 1 disorder, depressed, moderate (HCC)            Goals addressed in session: Goal 1 and Goal 2     DATA: Clinician met with Katherine via telehealth for individual therapy. Katherine processed attempts at changing her diet due to health concerns. She reports frustration with this with lack of clear information.   Clinician explores possible solutions and resources she could contact to obtain necessary information. Clinician check in on Katherine engagement in the community and hobbies. She reports lack of activities that she would like to engage in at this time but continuing to research local activities.   During this session, this clinician used the following therapeutic modalities: Client-centered Therapy and Supportive Psychotherapy    Substance Abuse was not addressed during this session. If the client is diagnosed with a co-occurring substance use disorder, please indicate any changes in the frequency or amount of use: n/a. Stage of change for addressing substance use diagnoses: No substance use/Not applicable    ASSESSMENT:  Almita Julio presents with a Euthymic/ normal and Anxious mood.     her affect is Normal range and intensity, which is congruent, with her mood and the content of the session. The client has made progress on their goals.    Katherine was open and engaged in the session.  Almita Julio presents with a none risk of suicide, none risk of self-harm, and none risk of harm to others.    For any risk assessment that surpasses a \"low\" rating, a safety plan must be developed.    A safety plan was indicated: no  If yes, describe in detail n/a    PLAN: Between sessions, Almita Julio will " engage in the community in a healthy way and work towards better boundaries with her daughter. At the next session, the therapist will use Client-centered Therapy and Supportive Psychotherapy to address anxiety and depressive symptoms.    Behavioral Health Treatment Plan and Discharge Planning: Almita Julio is aware of and agrees to continue to work on their treatment plan. They have identified and are working toward their discharge goals. yes    Visit start and stop times:    01/30/24  Start Time: 0804  Stop Time: 0847  Total Visit Time: 43 minutes

## 2024-02-01 ENCOUNTER — TELEPHONE (OUTPATIENT)
Dept: PSYCHIATRY | Facility: CLINIC | Age: 64
End: 2024-02-01

## 2024-02-08 ENCOUNTER — TELEPHONE (OUTPATIENT)
Dept: PSYCHIATRY | Facility: CLINIC | Age: 64
End: 2024-02-08

## 2024-02-13 ENCOUNTER — TELEMEDICINE (OUTPATIENT)
Dept: BEHAVIORAL/MENTAL HEALTH CLINIC | Facility: CLINIC | Age: 64
End: 2024-02-13
Payer: COMMERCIAL

## 2024-02-13 DIAGNOSIS — F31.32 BIPOLAR 1 DISORDER, DEPRESSED, MODERATE (HCC): Primary | ICD-10-CM

## 2024-02-13 PROCEDURE — 90834 PSYTX W PT 45 MINUTES: CPT | Performed by: COUNSELOR

## 2024-02-13 NOTE — PSYCH
Virtual Regular Visit    Verification of patient location:    Patient is located at Home in the following state in which I hold an active license [place]      Assessment/Plan:    Problem List Items Addressed This Visit     Bipolar 1 disorder, depressed, moderate (HCC) - Primary       Goals addressed in session: Goal 1 and Goal 2          Reason for visit is   Chief Complaint   Patient presents with   • Virtual Regular Visit          Encounter provider Marylu Castrejon    Provider located at [facility]  [facility] [place]  [address] [id number] [phone]      Recent Visits  Date Type Provider Dept   02/08/24 Telephone [facility] [place]   Showing recent visits within past 7 days and meeting all other requirements  Today's Visits  Date Type Provider Dept   02/13/24 Telemedicine [facility] [place]   Showing today's visits and meeting all other requirements  Future Appointments  No visits were found meeting these conditions.  Showing future appointments within next 150 days and meeting all other requirements       The patient was identified by name and date of birth. Almita Julio was informed that this is a telemedicine visit and that the visit is being conducted throughthe Epic Embedded platform. She agrees to proceed..  My office door was closed. No one else was in the room.  She acknowledged consent and understanding of privacy and security of the video platform. The patient has agreed to participate and understands they can discontinue the visit at any time.    Patient is aware this is a billable service.     Subjective  Almita Julio is a 63 y.o. female  .      HPI     No past medical history on file.    No past surgical history on file.    No current outpatient medications on file.     No current facility-administered medications for this visit.        Allergies  "  Allergen Reactions   • Lithium Dizziness and Headache   • Depakote [Valproic Acid] Rash   • Geodon [Ziprasidone] Rash   • Lamictal [Lamotrigine] Rash       Review of Systems    Video Exam    There were no vitals filed for this visit.    Physical Exam     Behavioral Health Psychotherapy Progress Note    Psychotherapy Provided: Individual Psychotherapy     1. Bipolar 1 disorder, depressed, moderate (HCC)            Goals addressed in session: Goal 1 and Goal 2     DATA: Clinician met with Katherine via telehealth for individual therapy. Katherine processed recent interactions with daughter and feeling manipulated. Clinician explored future anxiety and discussed ways to set and maintain current boundaries with daughter. She reports feeling overwhelmed recently due to stress. Clinician checked in on use of grounding and breathing exercises to calm her mind and body in the moment.   During this session, this clinician used the following therapeutic modalities: Client-centered Therapy and Solution-Focused Therapy    Substance Abuse was not addressed during this session. If the client is diagnosed with a co-occurring substance use disorder, please indicate any changes in the frequency or amount of use: n/a. Stage of change for addressing substance use diagnoses: No substance use/Not applicable    ASSESSMENT:  Almita Julio presents with a Euthymic/ normal and Anxious mood.     her affect is Normal range and intensity, which is congruent, with her mood and the content of the session. The client has made progress on their goals.    Katherine was open and engaged in the session. Almita Julio presents with a none risk of suicide, none risk of self-harm, and none risk of harm to others.    For any risk assessment that surpasses a \"low\" rating, a safety plan must be developed.    A safety plan was indicated: no  If yes, describe in detail n/a    PLAN: Between sessions, Almita Julio will continue to work on setting and maintaining " healthy boundaries. At the next session, the therapist will use Client-centered Therapy and Solution-Focused Therapy to address anxiety.    Behavioral Health Treatment Plan and Discharge Planning: Almita Julio is aware of and agrees to continue to work on their treatment plan. They have identified and are working toward their discharge goals. yes    Visit start and stop times:    02/13/24  Start Time: 0803  Stop Time: 0848  Total Visit Time: 45 minutes

## 2024-02-21 ENCOUNTER — TELEPHONE (OUTPATIENT)
Dept: PSYCHIATRY | Facility: CLINIC | Age: 64
End: 2024-02-21

## 2024-02-21 NOTE — TELEPHONE ENCOUNTER
Patient called requesting to speak with provider as pt shared she is struggeling today. Writer offered to reschedule appt on 2/27 to openings this week or to transfer to clinical staff, patient shared she would like to keep appt just speak with provider.    Pt can be reached at [phone], she is available the rest of the day

## 2024-02-21 NOTE — TELEPHONE ENCOUNTER
Patient called and requested to speak with provider and stated she is available for the rest of the day

## 2024-02-27 ENCOUNTER — TELEMEDICINE (OUTPATIENT)
Dept: BEHAVIORAL/MENTAL HEALTH CLINIC | Facility: CLINIC | Age: 64
End: 2024-02-27
Payer: COMMERCIAL

## 2024-02-27 DIAGNOSIS — F31.32 BIPOLAR 1 DISORDER, DEPRESSED, MODERATE (HCC): Primary | ICD-10-CM

## 2024-02-27 PROCEDURE — 90834 PSYTX W PT 45 MINUTES: CPT | Performed by: COUNSELOR

## 2024-02-27 NOTE — PSYCH
Virtual Regular Visit    Verification of patient location:    Patient is located at Home in the following state in which I hold an active license [place]      Assessment/Plan:    Problem List Items Addressed This Visit     Bipolar 1 disorder, depressed, moderate (HCC) - Primary       Goals addressed in session: Goal 1 and Goal 2          Reason for visit is   Chief Complaint   Patient presents with   • Virtual Regular Visit          Encounter provider Marylu Castrejon    Provider located at [facility]  [facility] [place]  [address] [id number] [phone]      Recent Visits  Date Type Provider Dept   02/21/24 Telephone [facility] [place]   Showing recent visits within past 7 days and meeting all other requirements  Today's Visits  Date Type Provider Dept   02/27/24 Telemedicine [facility] [place]   Showing today's visits and meeting all other requirements  Future Appointments  No visits were found meeting these conditions.  Showing future appointments within next 150 days and meeting all other requirements       The patient was identified by name and date of birth. Almita Julio was informed that this is a telemedicine visit and that the visit is being conducted throughthe Epic Embedded platform. She agrees to proceed..  My office door was closed. No one else was in the room.  She acknowledged consent and understanding of privacy and security of the video platform. The patient has agreed to participate and understands they can discontinue the visit at any time.    Patient is aware this is a billable service.     Subjective  Almita Julio is a 63 y.o. female  .      HPI     No past medical history on file.    No past surgical history on file.    No current outpatient medications on file.     No current facility-administered medications for this visit.        Allergies  "  Allergen Reactions   • Lithium Dizziness and Headache   • Depakote [Valproic Acid] Rash   • Geodon [Ziprasidone] Rash   • Lamictal [Lamotrigine] Rash       Review of Systems    Video Exam    There were no vitals filed for this visit.    Physical Exam     Behavioral Health Psychotherapy Progress Note    Psychotherapy Provided: Individual Psychotherapy     1. Bipolar 1 disorder, depressed, moderate (HCC)            Goals addressed in session: Goal 1 and Goal 2     DATA: Clinician met with Katherine via telehealth for individual therapy. Katherine reports recently feeling \"all over the place.\" Clinician explored emotions and sensations that go along with that feeling and possible coping skills such as grounding, meditation or breathing exercised to help to ground. She discussed situations in which she has been  struggling to remember things that people have said. Clinician explored memory issues and memory concerns being a possible side effect of high anxiety.  During this session, this clinician used the following therapeutic modalities: Client-centered Therapy and Supportive Psychotherapy    Substance Abuse was not addressed during this session. If the client is diagnosed with a co-occurring substance use disorder, please indicate any changes in the frequency or amount of use: n/a. Stage of change for addressing substance use diagnoses: No substance use/Not applicable    ASSESSMENT:  Almita Julio presents with a Euthymic/ normal and Anxious mood.     her affect is Normal range and intensity, which is congruent, with her mood and the content of the session. The client has made progress on their goals.    Katherine was open and engaged in the session.  Almita Julio presents with a none risk of suicide, none risk of self-harm, and none risk of harm to others.    For any risk assessment that surpasses a \"low\" rating, a safety plan must be developed.    A safety plan was indicated: no  If yes, describe in detail n/a    PLAN: " Between sessions, Almita Julio will practice learned coping skills/ grounding exercise. At the next session, the therapist will use Client-centered Therapy, Solution-Focused Therapy, and Supportive Psychotherapy to address anxiety.    Behavioral Health Treatment Plan and Discharge Planning: Almita Julio is aware of and agrees to continue to work on their treatment plan. They have identified and are working toward their discharge goals. yes    Visit start and stop times:    02/27/24  Start Time: 0800  Stop Time: 0852  Total Visit Time: 52 minutes

## 2024-03-12 ENCOUNTER — TELEMEDICINE (OUTPATIENT)
Dept: BEHAVIORAL/MENTAL HEALTH CLINIC | Facility: CLINIC | Age: 64
End: 2024-03-12
Payer: COMMERCIAL

## 2024-03-12 DIAGNOSIS — F31.32 BIPOLAR 1 DISORDER, DEPRESSED, MODERATE (HCC): Primary | ICD-10-CM

## 2024-03-12 PROCEDURE — 90834 PSYTX W PT 45 MINUTES: CPT | Performed by: COUNSELOR

## 2024-03-12 NOTE — BH CRISIS PLAN
Client Name: Almita Julio       Client YOB: 1960    TeofiloDavid Safety Plan    Creation Date: 3/12/24 Update Date: 3/11/25   Created By: Marylu Castrejon       Step 1: Warning Signs:   Warning Signs   feeling scattered   dissociation   memory issues            Step 2: Internal Coping Strategies:   Internal Coping Strategies   reading   journaling   mindfulness techniques   crafts            Step 3: People and social settings that provide distraction:   Name Contact Information   Urmila- daughter call/text   Chavez- son     Places   my bedroom         Step 4: People whom I can ask for help during a crisis:    Name Contact Information    Johanne - Daughter call/text    Chavez- son       Step 5: Professionals or agencies I can contact during a crisis:    Clinican/Agency Name Phone Emergency Contact    [facility]- Marylu Castrejon [phone]     Yanira Foster- Dr Mcginnis [phone]       Crisis Phone Numbers:   Suicide Prevention Lifeline: Call or Text  988 Crisis Text Line: Text HOME to 237-929   Please note: Some [facility] do not have a separate number for Child/Adolescent specific crisis. If your county is not listed under Child/Adolescent, please call the adult number for your county      Adult Crisis Numbers: Child/Adolescent Crisis Numbers   [facility]: [phone] [facility]: [phone]   [facility]: [phone] [facility]: [phone]   [facility]: [phone] [place]: [phone]   [facility]: [phone] [facility]: [phone]   [facility]: [phone]   [facility]: [phone]   [facility]: [phone]   [facility]: [phone] (daytime) [phone] (after hours, weekends, holidays)      Step 6: Making the environment safer (plan for lethal means safety):   Patient did not identify any lethal methods: Yes     Optional: What is most important to me and worth living for?   My children and  grandchildren     Teofilo-David Safety Plan. Mackenzie Red and Renzo Hernandez. Used with permission of the authors.

## 2024-03-12 NOTE — BH TREATMENT PLAN
Outpatient Behavioral Health Psychotherapy Treatment Plan     Almita Julio  1960      Date of Initial Psychotherapy Assessment: 8/31/21   Date of Current Treatment Plan: 3/12/24  Treatment Plan Target Date: 8/8/24  Treatment Plan Expiration Date: 9/8/24     Diagnosis:   1. Bipolar 1 disorder, depressed, moderate (HCC)                Area(s) of Need: Setting boundaries, healthy communication, increased engagement in community/person hobbies.     Long Term Goal 1 (in the client's own words): set boundaries with daughter     Stage of Change: Action     Target Date for completion: 9/8/24             Anticipated therapeutic modalities: Client Centered, Solution Focused, CBT             People identified to complete this goals: Esmer             Objective 1: (identify the means of measuring success in meeting the objective): Learn to assertively communicate needs and boundaries to adult daughter                    Objective 2: (identify the means of measuring success in meeting the objective): Say 'no' to daughter to at least one event per month      Long Term Goal 2 (in the client's own words): decrease depressive symptoms     Stage of Change: Action     Target Date for completion: 9/8/24             Anticipated therapeutic modalities: Client Centered, supportive psychotherapy, CBT             People identified to complete this goal: Esmer                    Objective 1: (identify the means of measuring success in meeting the objective): Teach and practice healthy coping skills in order to decrease depressive symptoms                    Objective 2: (identify the means of measuring success in meeting the objective): Increase engagement in the community to at least one event per month outside of her home.        I am currently under the care of a [facility] psychiatric provider: no     My . [facility] psychiatric provider is: n/a  Dr Carissa Foster     I am currently taking psychiatric  medications: Yes, as prescribed     I feel that I will be ready for discharge from mental health care when I reach the following (measurable goal/objective): Be able to say no without guilt when my daughter asked me to do something I do not want to do.     For children and adults who have a legal guardian:          Has there been any change to custody orders and/or guardianship status? NA. If yes, attach updated documentation.     I have updated my Crisis Plan and have been offered a copy of this plan     Behavioral Health Treatment Plan [facility]: Diagnosis and Treatment Plan explained to Almita Julio acknowledges an understanding of their diagnosis. Almita Julio agrees to this treatment plan.     I have been offered a copy of this Treatment Plan. Yes    Almita Julio, 1960, actively participated in the review and update of this treatment plan during a virtual session, using the Epic Embedded platform.   Almita Julio  provided verbal consent on 3/12/2024 at 8:35 AM. The treatment plan was transcribed into the Electronic Health Record at a later time.

## 2024-03-12 NOTE — PSYCH
Virtual Regular Visit    Verification of patient location:    Patient is located at Home in the following state in which I hold an active license [place]      Assessment/Plan:    Problem List Items Addressed This Visit     Bipolar 1 disorder, depressed, moderate (HCC) - Primary       Goals addressed in session: Goal 1 and Goal 2          Reason for visit is   Chief Complaint   Patient presents with   • Virtual Regular Visit        Encounter provider Marylu Castrejon    Provider located at [facility]  [facility] [place]  [address] [address] [phone]      Recent Visits  No visits were found meeting these conditions.  Showing recent visits within past 7 days and meeting all other requirements  Today's Visits  Date Type Provider Dept   03/12/24 Telemedicine [facility] [place]   Showing today's visits and meeting all other requirements  Future Appointments  No visits were found meeting these conditions.  Showing future appointments within next 150 days and meeting all other requirements       The patient was identified by name and date of birth. Almita Julio was informed that this is a telemedicine visit and that the visit is being conducted throughthe Epic Embedded platform. She agrees to proceed..  My office door was closed. No one else was in the room.  She acknowledged consent and understanding of privacy and security of the video platform. The patient has agreed to participate and understands they can discontinue the visit at any time.    Patient is aware this is a billable service.     Subjective  Almita Julio is a 63 y.o. female  .      HPI     No past medical history on file.    No past surgical history on file.    No current outpatient medications on file.     No current facility-administered medications for this visit.        Allergies   Allergen Reactions   • Lithium Dizziness and  "Headache   • Depakote [Valproic Acid] Rash   • Geodon [Ziprasidone] Rash   • Lamictal [Lamotrigine] Rash       Review of Systems    Video Exam    There were no vitals filed for this visit.    Physical Exam     Behavioral Health Psychotherapy Progress Note    Psychotherapy Provided: Individual Psychotherapy     1. Bipolar 1 disorder, depressed, moderate (HCC)            Goals addressed in session: Goal 1     DATA: Clinician met with Katherine via telehealth for individual therapy. Clinician explored treatment progress and goals and updated treatment plan. Clinician created safety plan with Katherine. Katherine processed an increase in mood in the last few weeks and processed changes that she has  made that have been beneficial. She reports that she has been practicing more mindfulness to help ground herself and be more present.   During this session, this clinician used the following therapeutic modalities: Client-centered Therapy and Supportive Psychotherapy    Substance Abuse was not addressed during this session. If the client is diagnosed with a co-occurring substance use disorder, please indicate any changes in the frequency or amount of use: n/a. Stage of change for addressing substance use diagnoses: No substance use/Not applicable    ASSESSMENT:  Almita Julio presents with a Euthymic/ normal and Anxious mood.     her affect is Normal range and intensity, which is congruent, with her mood and the content of the session. The client has made progress on their goals.    Katherine was open and engaged in the session. Almita Julio presents with a none risk of suicide, none risk of self-harm, and none risk of harm to others.    For any risk assessment that surpasses a \"low\" rating, a safety plan must be developed.    A safety plan was indicated: no  If yes, describe in detail n/a    PLAN: Between sessions, Almita Julio will utilize mindfulness techniques. At the next session, the therapist will use Client-centered Therapy, " Mindfulness-based Strategies, and Supportive Psychotherapy to address depressive symptoms.    Behavioral Health Treatment Plan and Discharge Planning: Almita Julio is aware of and agrees to continue to work on their treatment plan. They have identified and are working toward their discharge goals. yes    Visit start and stop times:    03/12/24  Start Time: 0805  Stop Time: 0847  Total Visit Time: 42 minutes

## 2024-03-14 ENCOUNTER — TELEPHONE (OUTPATIENT)
Dept: PSYCHIATRY | Facility: CLINIC | Age: 64
End: 2024-03-14

## 2024-03-26 ENCOUNTER — TELEMEDICINE (OUTPATIENT)
Dept: BEHAVIORAL/MENTAL HEALTH CLINIC | Facility: CLINIC | Age: 64
End: 2024-03-26
Payer: COMMERCIAL

## 2024-03-26 DIAGNOSIS — F31.32 BIPOLAR 1 DISORDER, DEPRESSED, MODERATE (HCC): Primary | ICD-10-CM

## 2024-03-26 PROCEDURE — 90834 PSYTX W PT 45 MINUTES: CPT | Performed by: COUNSELOR

## 2024-03-26 NOTE — PSYCH
Virtual Regular Visit    Verification of patient location:    Patient is located at Home in the following state in which I hold an active license [place]      Assessment/Plan:    Problem List Items Addressed This Visit     Bipolar 1 disorder, depressed, moderate (HCC) - Primary       Goals addressed in session: Goal 1          Reason for visit is   Chief Complaint   Patient presents with   • Virtual Regular Visit          Encounter provider Marylu Castrejon    Provider located at [facility] [place]  [facility] [place]  [address]  [place] [address] [phone]      Recent Visits  No visits were found meeting these conditions.  Showing recent visits within past 7 days and meeting all other requirements  Today's Visits  Date Type Provider Dept   03/26/24 Telemedicine Marylu Castrejon [facility] [place]   Showing today's visits and meeting all other requirements  Future Appointments  No visits were found meeting these conditions.  Showing future appointments within next 150 days and meeting all other requirements       The patient was identified by name and date of birth. Almita Julio was informed that this is a telemedicine visit and that the visit is being conducted throughthe Epic Embedded platform. She agrees to proceed..  My office door was closed. No one else was in the room.  She acknowledged consent and understanding of privacy and security of the video platform. The patient has agreed to participate and understands they can discontinue the visit at any time.    Patient is aware this is a billable service.     Subjective  Almita Julio is a 64 y.o. female  .      HPI     No past medical history on file.    No past surgical history on file.    No current outpatient medications on file.     No current facility-administered medications for this visit.        Allergies   Allergen Reactions   • Lithium Dizziness and Headache   •  "Depakote [Valproic Acid] Rash   • Geodon [Ziprasidone] Rash   • Lamictal [Lamotrigine] Rash       Review of Systems    Video Exam    There were no vitals filed for this visit.    Physical Exam     Behavioral Health Psychotherapy Progress Note    Psychotherapy Provided: Individual Psychotherapy     1. Bipolar 1 disorder, depressed, moderate (HCC)            Goals addressed in session: Goal 1 and Goal 2     DATA: Clinician met with Katherine via telehealth for individual therapy. Katherine processed feeling scattered and struggling to be present in the moment lately. Clinician explored symptoms and use of grounding exercises. She discussed increase in stress related to conflict in her family and working on supporting conflict resolution between her children. Clinician explored Katherine's role in conflict and ways she can support resolution.  During this session, this clinician used the following therapeutic modalities: Client-centered Therapy and Supportive Psychotherapy    Substance Abuse was not addressed during this session. If the client is diagnosed with a co-occurring substance use disorder, please indicate any changes in the frequency or amount of use: n/a. Stage of change for addressing substance use diagnoses: No substance use/Not applicable    ASSESSMENT:  Almita Julio presents with a Euthymic/ normal and Anxious mood.     her affect is Normal range and intensity, which is congruent, with her mood and the content of the session. The client has made progress on their goals.    Katherine was open and engaged in the session.  Almita Julio presents with a none risk of suicide, none risk of self-harm, and none risk of harm to others.    For any risk assessment that surpasses a \"low\" rating, a safety plan must be developed.    A safety plan was indicated: no  If yes, describe in detail n/a    PLAN: Between sessions, Almita Julio will utilize grounding exerices. At the next session, the therapist will use Client-centered " Therapy, Solution-Focused Therapy, and Supportive Psychotherapy to address anxiety and depressive symptoms.    Behavioral Health Treatment Plan and Discharge Planning: Almita Julio is aware of and agrees to continue to work on their treatment plan. They have identified and are working toward their discharge goals. yes    Visit start and stop times:    03/26/24  Start Time: 0805  Stop Time: 0850  Total Visit Time: 45 minutes

## 2024-04-09 ENCOUNTER — TELEMEDICINE (OUTPATIENT)
Dept: BEHAVIORAL/MENTAL HEALTH CLINIC | Facility: CLINIC | Age: 64
End: 2024-04-09

## 2024-04-09 DIAGNOSIS — F31.32 BIPOLAR 1 DISORDER, DEPRESSED, MODERATE (HCC): Primary | ICD-10-CM

## 2024-04-09 NOTE — PSYCH
Virtual Regular Visit    Verification of patient location:    Patient is located at Home in the following state in which I hold an active license [place]      Assessment/Plan:    Problem List Items Addressed This Visit     Bipolar 1 disorder, depressed, moderate (HCC) - Primary       Goals addressed in session: Goal 1 and Goal 2          Reason for visit is   Chief Complaint   Patient presents with   • Virtual Regular Visit          Encounter provider Marylu Castrejon    Provider located at [facility] [place]  [facility] [place]  [address] [address] [phone]      Recent Visits  No visits were found meeting these conditions.  Showing recent visits within past 7 days and meeting all other requirements  Today's Visits  Date Type Provider Dept   04/09/24 Telemedicine Marylu Castrejon [facility] [place]   Showing today's visits and meeting all other requirements  Future Appointments  No visits were found meeting these conditions.  Showing future appointments within next 150 days and meeting all other requirements       The patient was identified by name and date of birth. Almita Julio was informed that this is a telemedicine visit and that the visit is being conducted throughthe Epic Embedded platform. She agrees to proceed..  My office door was closed. No one else was in the room.  She acknowledged consent and understanding of privacy and security of the video platform. The patient has agreed to participate and understands they can discontinue the visit at any time.    Patient is aware this is a billable service.     Subjective  Almita Julio is a 64 y.o. female  .      HPI     No past medical history on file.    No past surgical history on file.    No current outpatient medications on file.     No current facility-administered medications for this visit.        Allergies   Allergen Reactions   • Lithium Dizziness and  "Headache   • Depakote [Valproic Acid] Rash   • Geodon [Ziprasidone] Rash   • Lamictal [Lamotrigine] Rash       Review of Systems    Video Exam    There were no vitals filed for this visit.    Physical Exam     Behavioral Health Psychotherapy Progress Note    Psychotherapy Provided: Individual Psychotherapy     1. Bipolar 1 disorder, depressed, moderate (HCC)            Goals addressed in session: Goal 1 and Goal 2     DATA: Clinician met with Katherine via telehealth for individual therapy. Katherine processed recent changes in medication and working to manage changes in dosages and planning out doses. She reports having her daughter check her medication and budget. She reports continuing to utilize grounding techniques to better manage dissociation and being more present. Katherine processed continued work towards healthy boundaries with youngest daughter.  During this session, this clinician used the following therapeutic modalities: Client-centered Therapy and Supportive Psychotherapy    Substance Abuse was not addressed during this session. If the client is diagnosed with a co-occurring substance use disorder, please indicate any changes in the frequency or amount of use: n/a. Stage of change for addressing substance use diagnoses: No substance use/Not applicable    ASSESSMENT:  Almita Julio presents with a Euthymic/ normal and Anxious mood.     her affect is Normal range and intensity, which is congruent, with her mood and the content of the session. The client has made progress on their goals.    Katherine was open and engaged in the session.  Almita Julio presents with a none risk of suicide, none risk of self-harm, and none risk of harm to others.    For any risk assessment that surpasses a \"low\" rating, a safety plan must be developed.    A safety plan was indicated: yes  If yes, describe in detail n/a    PLAN: Between sessions, Almita Julio will utilize learned coping skills. At the next session, the therapist " will use Client-centered Therapy and Supportive Psychotherapy to address anxiety.    Behavioral Health Treatment Plan and Discharge Planning: Almita uJlio is aware of and agrees to continue to work on their treatment plan. They have identified and are working toward their discharge goals. yes    Visit start and stop times:    04/09/24  Start Time: 0800  Stop Time: 0848  Total Visit Time: 48 minutes

## 2024-04-23 ENCOUNTER — TELEMEDICINE (OUTPATIENT)
Dept: BEHAVIORAL/MENTAL HEALTH CLINIC | Facility: CLINIC | Age: 64
End: 2024-04-23
Payer: COMMERCIAL

## 2024-04-23 DIAGNOSIS — F31.32 BIPOLAR 1 DISORDER, DEPRESSED, MODERATE (HCC): Primary | ICD-10-CM

## 2024-04-23 PROCEDURE — 90834 PSYTX W PT 45 MINUTES: CPT | Performed by: COUNSELOR

## 2024-04-23 NOTE — PSYCH
Virtual Regular Visit    Verification of patient location:    Patient is located at Home in the following state in which I hold an active license [place]      Assessment/Plan:    Problem List Items Addressed This Visit     Bipolar 1 disorder, depressed, moderate (HCC) - Primary       Goals addressed in session: Goal 1 and Goal 2          Reason for visit is   Chief Complaint   Patient presents with   • Virtual Regular Visit          Encounter provider Marylu Castrejon    Provider located at [facility] [place]  [facility] [place]  [address] [address] [phone]      Recent Visits  No visits were found meeting these conditions.  Showing recent visits within past 7 days and meeting all other requirements  Today's Visits  Date Type Provider Dept   04/23/24 Telemedicine Marylu Castrejon [facility] [place]   Showing today's visits and meeting all other requirements  Future Appointments  No visits were found meeting these conditions.  Showing future appointments within next 150 days and meeting all other requirements       The patient was identified by name and date of birth. Almita Julio was informed that this is a telemedicine visit and that the visit is being conducted throughthe Epic Embedded platform. She agrees to proceed..  My office door was closed. No one else was in the room.  She acknowledged consent and understanding of privacy and security of the video platform. The patient has agreed to participate and understands they can discontinue the visit at any time.    Patient is aware this is a billable service.     Subjective  Almita Julio is a 64 y.o. female  .      HPI     No past medical history on file.    No past surgical history on file.    No current outpatient medications on file.     No current facility-administered medications for this visit.        Allergies   Allergen Reactions   • Lithium Dizziness and  "Headache   • Depakote [Valproic Acid] Rash   • Geodon [Ziprasidone] Rash   • Lamictal [Lamotrigine] Rash       Review of Systems    Video Exam    There were no vitals filed for this visit.    Physical Exam     Behavioral Health Psychotherapy Progress Note    Psychotherapy Provided: Individual Psychotherapy     1. Bipolar 1 disorder, depressed, moderate (HCC)            Goals addressed in session: Goal 1 and Goal 2     DATA: Clinician met with Katherine via telehealth for individual therapy. Katherine processed concerns related to her medication and finding a strategies that works to prepare her medication in pill boxes and take her medication as prescribed. She reports that she had recently gotten confused with this processed and accidentally took the wrong medication. Clinician explored Katherine's supports that she can utilize to assist her. She reports that her daughter has been helping but she often feels like a burden. Clinician explored feelings and discussed reframing negative thoughts. Clinician explored ways to communicate feelings with her daughter.  During this session, this clinician used the following therapeutic modalities: Client-centered Therapy and Supportive Psychotherapy    Substance Abuse was not addressed during this session. If the client is diagnosed with a co-occurring substance use disorder, please indicate any changes in the frequency or amount of use: n/a. Stage of change for addressing substance use diagnoses: No substance use/Not applicable    ASSESSMENT:  Almita Julio presents with a Euthymic/ normal and Anxious mood.     her affect is Normal range and intensity, which is congruent, with her mood and the content of the session. The client has made progress on their goals.    Katherine was open and engaged in the session. Almita Julio presents with a none risk of suicide, none risk of self-harm, and none risk of harm to others.    For any risk assessment that surpasses a \"low\" rating, a safety " plan must be developed.    A safety plan was indicated: no  If yes, describe in detail n/a    PLAN: Between sessions, Almita Julio will utilize known coping skills. At the next session, the therapist will use Client-centered Therapy, Solution-Focused Therapy, and Supportive Psychotherapy to address anxiety.    Behavioral Health Treatment Plan and Discharge Planning: Almita Julio is aware of and agrees to continue to work on their treatment plan. They have identified and are working toward their discharge goals. yes    Visit start and stop times:    04/23/24  Start Time: 0802  Stop Time: 0853  Total Visit Time: 51 minutes

## 2024-05-07 ENCOUNTER — TELEMEDICINE (OUTPATIENT)
Dept: BEHAVIORAL/MENTAL HEALTH CLINIC | Facility: CLINIC | Age: 64
End: 2024-05-07

## 2024-05-07 DIAGNOSIS — F31.32 BIPOLAR 1 DISORDER, DEPRESSED, MODERATE (HCC): Primary | ICD-10-CM

## 2024-05-07 NOTE — PSYCH
Virtual Regular Visit    Verification of patient location:    Patient is located at Home in the following state in which I hold an active license [place]      Assessment/Plan:    Problem List Items Addressed This Visit     Bipolar 1 disorder, depressed, moderate (HCC) - Primary       Goals addressed in session: Goal 1 and Goal 2          Reason for visit is   Chief Complaint   Patient presents with   • Virtual Regular Visit        Encounter provider Marylu Castrejon      Recent Visits  No visits were found meeting these conditions.  Showing recent visits within past 7 days and meeting all other requirements  Today's Visits  Date Type Provider Dept   05/07/24 Telemedicine Marylu Castrejon [facility] [place]   Showing today's visits and meeting all other requirements  Future Appointments  No visits were found meeting these conditions.  Showing future appointments within next 150 days and meeting all other requirements       The patient was identified by name and date of birth. Almita Julio was informed that this is a telemedicine visit and that the visit is being conducted throughthe Epic Embedded platform. She agrees to proceed..  My office door was closed. No one else was in the room.  She acknowledged consent and understanding of privacy and security of the video platform. The patient has agreed to participate and understands they can discontinue the visit at any time.    Patient is aware this is a billable service.     Subjective  Almita Julio is a 64 y.o. female  .      HPI     No past medical history on file.    No past surgical history on file.    No current outpatient medications on file.     No current facility-administered medications for this visit.        Allergies   Allergen Reactions   • Lithium Dizziness and Headache   • Depakote [Valproic Acid] Rash   • Geodon [Ziprasidone] Rash   • Lamictal [Lamotrigine] Rash       Review of Systems    Video Exam    There were no vitals filed for  "this visit.    Physical Exam     Behavioral Health Psychotherapy Progress Note    Psychotherapy Provided: Individual Psychotherapy     1. Bipolar 1 disorder, depressed, moderate (HCC)            Goals addressed in session: Goal 1 and Goal 2     DATA: Clinician met with Katherine via telehealth for individual therapy. Katherine processed side effect from recent medication change and contacting provider to discuss and plan to discontinue medication. Katherine reports an increase in focus and ability to retain information since discontinuing the medication. Clinician discussed benefits and overall mood increase. Katherine processed continued work on boundaries with her daughter. Katherine processed working on increased independence and strategies that have assisted in this.  During this session, this clinician used the following therapeutic modalities: Client-centered Therapy, Solution-Focused Therapy, and Supportive Psychotherapy    Substance Abuse was not addressed during this session. If the client is diagnosed with a co-occurring substance use disorder, please indicate any changes in the frequency or amount of use: n/a. Stage of change for addressing substance use diagnoses: No substance use/Not applicable    ASSESSMENT:  Almita Julio presents with a Euthymic/ normal and Anxious mood.     her affect is Normal range and intensity, which is congruent, with her mood and the content of the session. The client has made progress on their goals.    Katherine was open and engaged in the session.  Almita Julio presents with a none risk of suicide, none risk of self-harm, and none risk of harm to others.    For any risk assessment that surpasses a \"low\" rating, a safety plan must be developed.    A safety plan was indicated: no  If yes, describe in detail n/a    PLAN: Between sessions, Almita Julio will utilize communication strategies. At the next session, the therapist will use Client-centered Therapy and Supportive Psychotherapy to " address depressive symptoms.    Behavioral Health Treatment Plan and Discharge Planning: Almita Julio is aware of and agrees to continue to work on their treatment plan. They have identified and are working toward their discharge goals. yes    Visit start and stop times:    05/07/24  Start Time: 0803  Stop Time: 0850  Total Visit Time: 47 minutes

## 2024-05-21 ENCOUNTER — TELEMEDICINE (OUTPATIENT)
Dept: BEHAVIORAL/MENTAL HEALTH CLINIC | Facility: CLINIC | Age: 64
End: 2024-05-21

## 2024-05-21 DIAGNOSIS — F31.32 BIPOLAR 1 DISORDER, DEPRESSED, MODERATE (HCC): Primary | ICD-10-CM

## 2024-05-21 NOTE — PSYCH
Virtual Regular Visit    Verification of patient location:    Patient is located at Home in the following state in which I hold an active license [place]      Assessment/Plan:    Problem List Items Addressed This Visit     Bipolar 1 disorder, depressed, moderate (HCC) - Primary       Goals addressed in session: Goal 1 and Goal 2          Reason for visit is   Chief Complaint   Patient presents with   • Virtual Regular Visit          Encounter provider Marylu Castrejon      Recent Visits  No visits were found meeting these conditions.  Showing recent visits within past 7 days and meeting all other requirements  Today's Visits  Date Type Provider Dept   05/21/24 Telemedicine Marylu Castrejon [facility] [place]   Showing today's visits and meeting all other requirements  Future Appointments  No visits were found meeting these conditions.  Showing future appointments within next 150 days and meeting all other requirements       The patient was identified by name and date of birth. Almita Julio was informed that this is a telemedicine visit and that the visit is being conducted throughthe Epic Embedded platform. She agrees to proceed..  My office door was closed. No one else was in the room.  She acknowledged consent and understanding of privacy and security of the video platform. The patient has agreed to participate and understands they can discontinue the visit at any time.    Patient is aware this is a billable service.     Subjective  Almita Julio is a 64 y.o. female  .      HPI     No past medical history on file.    No past surgical history on file.    No current outpatient medications on file.     No current facility-administered medications for this visit.        Allergies   Allergen Reactions   • Lithium Dizziness and Headache   • Depakote [Valproic Acid] Rash   • Geodon [Ziprasidone] Rash   • Lamictal [Lamotrigine] Rash       Review of Systems    Video Exam    There were no vitals filed  "for this visit.    Physical Exam     Behavioral Health Psychotherapy Progress Note    Psychotherapy Provided: Individual Psychotherapy     1. Bipolar 1 disorder, depressed, moderate (HCC)            Goals addressed in session: Goal 1 and Goal 2     DATA: Clinician met with Katherine via telehealth for individual therapy. Katherine processed recent increase in issues with sleep. Clinician explored issues she has been having with falling asleep and controlling intrusive worry at bedtime.  Clinician discussed possible mindfulness techniques such as guided meditation and progressive muscle relaxation at bedtime. She discussed return to baseline since discontinuing Buspar and recent MM appointment with her provider. Clinician explored increased self care.  During this session, this clinician used the following therapeutic modalities: Client-centered Therapy, Mindfulness-based Strategies, and Supportive Psychotherapy    Substance Abuse was not addressed during this session. If the client is diagnosed with a co-occurring substance use disorder, please indicate any changes in the frequency or amount of use: n/a. Stage of change for addressing substance use diagnoses: No substance use/Not applicable    ASSESSMENT:  Almita Julio presents with a Euthymic/ normal and Anxious mood.     her affect is Normal range and intensity, which is congruent, with her mood and the content of the session. The client has made progress on their goals.    Katherine was open and engaged in the session.  Almita Julio presents with a none risk of suicide, none risk of self-harm, and none risk of harm to others.    For any risk assessment that surpasses a \"low\" rating, a safety plan must be developed.    A safety plan was indicated: no  If yes, describe in detail n/a    PLAN: Between sessions, Almita Julio will increase social interaction and supports. At the next session, the therapist will use Client-centered Therapy and Supportive Psychotherapy to " address anxiety.    Behavioral Health Treatment Plan and Discharge Planning: Almita Julio is aware of and agrees to continue to work on their treatment plan. They  identified and are working toward their discharge goals. yes    Visit start and stop times:    05/21/24  Start Time: 0805  Stop Time: 0850  Total Visit Time: 45 minutes

## 2024-06-04 ENCOUNTER — TELEMEDICINE (OUTPATIENT)
Dept: BEHAVIORAL/MENTAL HEALTH CLINIC | Facility: CLINIC | Age: 64
End: 2024-06-04
Payer: COMMERCIAL

## 2024-06-04 DIAGNOSIS — F31.32 BIPOLAR 1 DISORDER, DEPRESSED, MODERATE (HCC): Primary | ICD-10-CM

## 2024-06-04 PROCEDURE — 90834 PSYTX W PT 45 MINUTES: CPT | Performed by: COUNSELOR

## 2024-06-04 NOTE — PSYCH
Virtual Regular Visit    Verification of patient location:    Patient is located at Home in the following state in which I hold an active license [place]      Assessment/Plan:    Problem List Items Addressed This Visit     Bipolar 1 disorder, depressed, moderate (HCC) - Primary       Goals addressed in session: Goal 1 and Goal 2          Reason for visit is   Chief Complaint   Patient presents with   • Virtual Regular Visit          Encounter provider Marylu Castrejon      Recent Visits  No visits were found meeting these conditions.  Showing recent visits within past 7 days and meeting all other requirements  Today's Visits  Date Type Provider Dept   06/04/24 Telemedicine Marylu Castrejon [facility] Therapist [place]   Showing today's visits and meeting all other requirements  Future Appointments  No visits were found meeting these conditions.  Showing future appointments within next 150 days and meeting all other requirements       The patient was identified by name and date of birth. Almita Julio was informed that this is a telemedicine visit and that the visit is being conducted throughthe Epic Embedded platform. She agrees to proceed..  My office door was closed. No one else was in the room.  She acknowledged consent and understanding of privacy and security of the video platform. The patient has agreed to participate and understands they can discontinue the visit at any time.    Patient is aware this is a billable service.     Subjective  Almita Julio is a 64 y.o. female  .      HPI     No past medical history on file.    No past surgical history on file.    No current outpatient medications on file.     No current facility-administered medications for this visit.        Allergies   Allergen Reactions   • Lithium Dizziness and Headache   • Depakote [Valproic Acid] Rash   • Geodon [Ziprasidone] Rash   • Lamictal [Lamotrigine] Rash       Review of Systems    Video Exam    There were no vitals filed  "for this visit.    Physical Exam     Behavioral Health Psychotherapy Progress Note    Psychotherapy Provided: Individual Psychotherapy     1. Bipolar 1 disorder, depressed, moderate (HCC)            Goals addressed in session: Goal 1 and Goal 2     DATA: Clinician met with Katherine via telehealth for individual therapy. Katherine discussed being at her sons home for the week taking care of her grandchildren. She reports that she was having some anxiety related to being responsible for the kids but that she is feeling good now that she is there and is aware of the supports she has if she needs them. She discussed continue follow through with self care and engaging in activities in the community as much as possible to increase social engagement and support.   During this session, this clinician used the following therapeutic modalities: Client-centered Therapy and Supportive Psychotherapy    Substance Abuse was not addressed during this session. If the client is diagnosed with a co-occurring substance use disorder, please indicate any changes in the frequency or amount of use: n/a. Stage of change for addressing substance use diagnoses: No substance use/Not applicable    ASSESSMENT:  Almita Julio presents with a Euthymic/ normal and Anxious mood.     her affect is Normal range and intensity, which is congruent, with her mood and the content of the session. The client has made progress on their goals.    Katherine was open and engaged in the session.  Almita Julio presents with a none risk of suicide, none risk of self-harm, and none risk of harm to others.    For any risk assessment that surpasses a \"low\" rating, a safety plan must be developed.    A safety plan was indicated: no  If yes, describe in detail n/a    PLAN: Between sessions, Almita Julio will utilize healthy communication. At the next session, the therapist will use Client-centered Therapy and Supportive Psychotherapy to address anxiety and " boundaries.    Behavioral Health Treatment Plan and Discharge Planning: Almita Julio is aware of and agrees to continue to work on their treatment plan. They have identified and are working toward their discharge goals. yes    Visit start and stop times:    06/04/24  Start Time: 0807  Stop Time: 0848  Total Visit Time: 41 minutes

## 2024-06-18 ENCOUNTER — TELEMEDICINE (OUTPATIENT)
Dept: BEHAVIORAL/MENTAL HEALTH CLINIC | Facility: CLINIC | Age: 64
End: 2024-06-18
Payer: COMMERCIAL

## 2024-06-18 DIAGNOSIS — F31.32 BIPOLAR 1 DISORDER, DEPRESSED, MODERATE (HCC): Primary | ICD-10-CM

## 2024-06-18 PROCEDURE — 90834 PSYTX W PT 45 MINUTES: CPT | Performed by: COUNSELOR

## 2024-06-18 NOTE — PSYCH
Virtual Regular Visit    Verification of patient location:    Patient is located at Home in the following state in which I hold an active license [place]      Assessment/Plan:    Problem List Items Addressed This Visit     Bipolar 1 disorder, depressed, moderate (HCC) - Primary       Goals addressed in session: Goal 1 and Goal 2          Reason for visit is No chief complaint on file.       Encounter provider Marylu Castrejon      Recent Visits  No visits were found meeting these conditions.  Showing recent visits within past 7 days and meeting all other requirements  Today's Visits  Date Type Provider Dept   06/18/24 Telemedicine Marylu Castrejon [facility] Therapist [place]   Showing today's visits and meeting all other requirements  Future Appointments  No visits were found meeting these conditions.  Showing future appointments within next 150 days and meeting all other requirements       The patient was identified by name and date of birth. Almita Julio was informed that this is a telemedicine visit and that the visit is being conducted throughthe Epic Embedded platform. She agrees to proceed..  My office door was closed. No one else was in the room.  She acknowledged consent and understanding of privacy and security of the video platform. The patient has agreed to participate and understands they can discontinue the visit at any time.    Patient is aware this is a billable service.     Subjective  Almita Julio is a 64 y.o. female  .      HPI     No past medical history on file.    No past surgical history on file.    No current outpatient medications on file.     No current facility-administered medications for this visit.        Allergies   Allergen Reactions   • Lithium Dizziness and Headache   • Depakote [Valproic Acid] Rash   • Geodon [Ziprasidone] Rash   • Lamictal [Lamotrigine] Rash       Review of Systems    Video Exam    There were no vitals filed for this visit.    Physical Exam  "    Behavioral Health Psychotherapy Progress Note    Psychotherapy Provided: Individual Psychotherapy     1. Bipolar 1 disorder, depressed, moderate (HCC)            Goals addressed in session: Goal 1 and Goal 2     DATA: Clinician met with Katherine via telehealth for individual therapy. Katherine discussed recent stay with her sons children for the week and feeling more confident in herself in being able to do so successfully. Clinician explored areas of success as well as her resources she could call on in times of need. She discussed a theme of perfectionism in her life and working to give herself more freda in the moment. Clinician explored origin of this theme and challenged thoughts. Clinician discussed ways to address perfectionism with use of journaling and detailed work she can do to increase awareness and process feelings.  During this session, this clinician used the following therapeutic modalities: Client-centered Therapy and Supportive Psychotherapy    Substance Abuse was not addressed during this session. If the client is diagnosed with a co-occurring substance use disorder, please indicate any changes in the frequency or amount of use: n/a. Stage of change for addressing substance use diagnoses: No substance use/Not applicable    ASSESSMENT:  Almita Julio presents with a Euthymic/ normal and Anxious mood.     her affect is Normal range and intensity, which is congruent, with her mood and the content of the session. The client has made progress on their goals.    Katherine was open and engaged in the session.  Almita Julio presents with a none risk of suicide, none risk of self-harm, and none risk of harm to others.    For any risk assessment that surpasses a \"low\" rating, a safety plan must be developed.    A safety plan was indicated: no  If yes, describe in detail n/a    PLAN: Between sessions, Almita Julio will utilize healthy communication. At the next session, the therapist will use " Client-centered Therapy and Supportive Psychotherapy to address anxiety.    Behavioral Health Treatment Plan and Discharge Planning: Almita Julio is aware of and agrees to continue to work on their treatment plan. They have identified and are working toward their discharge goals. yes    Visit start and stop times:    06/18/24  Start Time: 0805  Stop Time: 0850  Total Visit Time: 45 minutes

## 2024-07-02 ENCOUNTER — TELEMEDICINE (OUTPATIENT)
Dept: BEHAVIORAL/MENTAL HEALTH CLINIC | Facility: CLINIC | Age: 64
End: 2024-07-02
Payer: COMMERCIAL

## 2024-07-02 DIAGNOSIS — F31.32 BIPOLAR 1 DISORDER, DEPRESSED, MODERATE (HCC): Primary | ICD-10-CM

## 2024-07-02 PROCEDURE — 90834 PSYTX W PT 45 MINUTES: CPT | Performed by: COUNSELOR

## 2024-07-02 NOTE — PSYCH
Virtual Regular Visit    Verification of patient location:    Patient is located at Home in the following state in which I hold an active license [place]      Assessment/Plan:    Problem List Items Addressed This Visit     Bipolar 1 disorder, depressed, moderate (HCC) - Primary       Goals addressed in session: Goal 1 and Goal 2          Reason for visit is No chief complaint on file.       Encounter provider Marylu Castrejon      Recent Visits  No visits were found meeting these conditions.  Showing recent visits within past 7 days and meeting all other requirements  Today's Visits  Date Type Provider Dept   07/02/24 Telemedicine Marylu Castrejon [facility] Therapist [place]   Showing today's visits and meeting all other requirements  Future Appointments  No visits were found meeting these conditions.  Showing future appointments within next 150 days and meeting all other requirements       The patient was identified by name and date of birth. Almita Julio was informed that this is a telemedicine visit and that the visit is being conducted throughthe Epic Embedded platform. She agrees to proceed..  My office door was closed. No one else was in the room.  She acknowledged consent and understanding of privacy and security of the video platform. The patient has agreed to participate and understands they can discontinue the visit at any time.    Patient is aware this is a billable service.     Subjective  Almita Julio is a 64 y.o. female  .      HPI     No past medical history on file.    No past surgical history on file.    No current outpatient medications on file.     No current facility-administered medications for this visit.        Allergies   Allergen Reactions   • Lithium Dizziness and Headache   • Depakote [Valproic Acid] Rash   • Geodon [Ziprasidone] Rash   • Lamictal [Lamotrigine] Rash       Review of Systems    Video Exam    There were no vitals filed for this visit.    Physical Exam  "    Behavioral Health Psychotherapy Progress Note    Psychotherapy Provided: Individual Psychotherapy     1. Bipolar 1 disorder, depressed, moderate (HCC)            Goals addressed in session: Goal 1 and Goal 2     DATA: Clinician met with Katherine via telehealth for individual therapy. Katherine discussed getting into a more regular schedule for her self care and increasing positive activities. She reports regularly walking, getting outside and engaging in coping skills. She also reports setting up future events with friends and family that she is looking forward to. Clinician reinforced successful use of coping skills and importance of follow through. She states overall mood has been stable and she has been able to set  better boundaries and say 'no' to daughter when she does not want to do things.   During this session, this clinician used the following therapeutic modalities: Client-centered Therapy and Supportive Psychotherapy    Substance Abuse was not addressed during this session. If the client is diagnosed with a co-occurring substance use disorder, please indicate any changes in the frequency or amount of use: n/a. Stage of change for addressing substance use diagnoses: No substance use/Not applicable    ASSESSMENT:  Almita Julio presents with a Euthymic/ normal and Anxious mood.     her affect is Normal range and intensity, which is congruent, with her mood and the content of the session. The client has made progress on their goals.    Katherine was open and engaged in the session.  Almita Julio presents with a none risk of suicide, none risk of self-harm, and none risk of harm to others.    For any risk assessment that surpasses a \"low\" rating, a safety plan must be developed.    A safety plan was indicated: no  If yes, describe in detail n/a    PLAN: Between sessions, Almita Julio will utilize boundaries and healthy communication. At the next session, the therapist will use Client-centered Therapy and " Supportive Psychotherapy to address mood stabilization.    Behavioral Health Treatment Plan and Discharge Planning: Almita Julio is aware of and agrees to continue to work on their treatment plan. They have identified and are working toward their discharge goals. yes    Visit start and stop times:    07/02/24  Start Time: 0801  Stop Time: 0845  Total Visit Time: 44 minutes

## 2024-07-16 ENCOUNTER — TELEMEDICINE (OUTPATIENT)
Dept: BEHAVIORAL/MENTAL HEALTH CLINIC | Facility: CLINIC | Age: 64
End: 2024-07-16
Payer: COMMERCIAL

## 2024-07-16 DIAGNOSIS — F31.32 BIPOLAR 1 DISORDER, DEPRESSED, MODERATE (HCC): Primary | ICD-10-CM

## 2024-07-16 PROCEDURE — 90834 PSYTX W PT 45 MINUTES: CPT | Performed by: COUNSELOR

## 2024-07-30 ENCOUNTER — TELEMEDICINE (OUTPATIENT)
Dept: BEHAVIORAL/MENTAL HEALTH CLINIC | Facility: CLINIC | Age: 64
End: 2024-07-30
Payer: COMMERCIAL

## 2024-07-30 DIAGNOSIS — F31.32 BIPOLAR 1 DISORDER, DEPRESSED, MODERATE (HCC): Primary | ICD-10-CM

## 2024-07-30 PROCEDURE — 90834 PSYTX W PT 45 MINUTES: CPT | Performed by: COUNSELOR

## 2024-07-30 NOTE — PSYCH
Virtual Regular Visit    Verification of patient location:    Patient is located at Home in the following state in which I hold an active license [place]      Assessment/Plan:    Problem List Items Addressed This Visit     Bipolar 1 disorder, depressed, moderate (HCC) - Primary       Goals addressed in session: Goal 1 and Goal 2          Reason for visit is   Chief Complaint   Patient presents with   • Virtual Regular Visit          Encounter provider Marylu Castrejon      Recent Visits  No visits were found meeting these conditions.  Showing recent visits within past 7 days and meeting all other requirements  Today's Visits  Date Type Provider Dept   07/30/24 Telemedicine Marylu Castrejon [facility] Therapist [place]   Showing today's visits and meeting all other requirements  Future Appointments  No visits were found meeting these conditions.  Showing future appointments within next 150 days and meeting all other requirements       The patient was identified by name and date of birth. Almita Julio was informed that this is a telemedicine visit and that the visit is being conducted throughthe Epic Embedded platform. She agrees to proceed..  My office door was closed. No one else was in the room.  She acknowledged consent and understanding of privacy and security of the video platform. The patient has agreed to participate and understands they can discontinue the visit at any time.    Patient is aware this is a billable service.     Subjective  Almita Julio is a 64 y.o. female  .      HPI     No past medical history on file.    No past surgical history on file.    No current outpatient medications on file.     No current facility-administered medications for this visit.        Allergies   Allergen Reactions   • Lithium Dizziness and Headache   • Depakote [Valproic Acid] Rash   • Geodon [Ziprasidone] Rash   • Lamictal [Lamotrigine] Rash       Review of Systems    Video Exam    There were no vitals filed  "for this visit.    Physical Exam     Behavioral Health Psychotherapy Progress Note    Psychotherapy Provided: Individual Psychotherapy     1. Bipolar 1 disorder, depressed, moderate (HCC)            Goals addressed in session: Goal 1 and Goal 2     DATA: Clinician met with Katherine via telehealth for individual therapy. Katherine processed relationship with adult children and working to increase assertive communication and advocating for herself as needed. She reports attempts to continue to work on boundaries with daughter when assisting with visits with her children. She processed recent outing where she spent more than what she was expecting and was frustrated with lack of planning on daughters part. Katherine reports desire to increase independence from immediate family so that she can get social needs met. Clinician discussed possible resources to assist with this goal.   During this session, this clinician used the following therapeutic modalities: Client-centered Therapy, Solution-Focused Therapy, and Supportive Psychotherapy    Substance Abuse was not addressed during this session. If the client is diagnosed with a co-occurring substance use disorder, please indicate any changes in the frequency or amount of use: n/a. Stage of change for addressing substance use diagnoses: No substance use/Not applicable    ASSESSMENT:  Almita Julio presents with a Euthymic/ normal and Anxious mood.     her affect is Normal range and intensity, which is congruent, with her mood and the content of the session. The client has made progress on their goals.    Katherine was open and engaged in the session.  Almita Julio presents with a none risk of suicide, none risk of self-harm, and none risk of harm to others.    For any risk assessment that surpasses a \"low\" rating, a safety plan must be developed.    A safety plan was indicated: no  If yes, describe in detail n/a    PLAN: Between sessions, Almita Julio will utilize known coping " skills. At the next session, the therapist will use Client-centered Therapy and Supportive Psychotherapy to address anxiety.    Behavioral Health Treatment Plan and Discharge Planning: Almita Julio is aware of and agrees to continue to work on their treatment plan. They have identified and are working toward their discharge goals. yes    Visit start and stop times:    07/30/24  Start Time: 0806  Stop Time: 0852  Total Visit Time: 46 minutes

## 2024-08-13 ENCOUNTER — TELEMEDICINE (OUTPATIENT)
Dept: BEHAVIORAL/MENTAL HEALTH CLINIC | Facility: CLINIC | Age: 64
End: 2024-08-13
Payer: MEDICARE

## 2024-08-13 DIAGNOSIS — F31.32 BIPOLAR 1 DISORDER, DEPRESSED, MODERATE (HCC): Primary | ICD-10-CM

## 2024-08-13 PROCEDURE — 90834 PSYTX W PT 45 MINUTES: CPT | Performed by: COUNSELOR

## 2024-08-13 NOTE — PSYCH
Virtual Regular Visit    Verification of patient location:    Patient is located at Home in the following state in which I hold an active license [place]      Assessment/Plan:    Problem List Items Addressed This Visit     Bipolar 1 disorder, depressed, moderate (HCC) - Primary       Goals addressed in session: Goal 1 and Goal 2          Reason for visit is   Chief Complaint   Patient presents with   • Virtual Regular Visit          Encounter provider Marylu Castrejon      Recent Visits  No visits were found meeting these conditions.  Showing recent visits within past 7 days and meeting all other requirements  Today's Visits  Date Type Provider Dept   08/13/24 Telemedicine Marylu Castrejon [facility] Therapist [place]   Showing today's visits and meeting all other requirements  Future Appointments  No visits were found meeting these conditions.  Showing future appointments within next 150 days and meeting all other requirements       The patient was identified by name and date of birth. Almita Julio was informed that this is a telemedicine visit and that the visit is being conducted throughthe Epic Embedded platform. She agrees to proceed..  My office door was closed. No one else was in the room.  She acknowledged consent and understanding of privacy and security of the video platform. The patient has agreed to participate and understands they can discontinue the visit at any time.    Patient is aware this is a billable service.     Subjective  Almita Julio is a 64 y.o. female  .      HPI     No past medical history on file.    No past surgical history on file.    No current outpatient medications on file.     No current facility-administered medications for this visit.        Allergies   Allergen Reactions   • Lithium Dizziness and Headache   • Depakote [Valproic Acid] Rash   • Geodon [Ziprasidone] Rash   • Lamictal [Lamotrigine] Rash       Review of Systems    Video Exam    There were no vitals filed  "for this visit.    Physical Exam     Behavioral Health Psychotherapy Progress Note    Psychotherapy Provided: Individual Psychotherapy     1. Bipolar 1 disorder, depressed, moderate (HCC)            Goals addressed in session: Goal 1 and Goal 2     DATA: Clinician met with Katherine via telehealth for individual therapy.  Katherine processed recent interactions with her daughter and lack of respect of her boundaries. She discussed plans to discuss her concerns with her daughter and express how her actions make her feel. Clinician provided feedback and healthy communication techniques. She reports desire to reestablish her boundaries with her daughter and work on an increase in assertive communication of needs. Katherine processed family dynamics in her home with her daughter and son in law and living with them and their children.   During this session, this clinician used the following therapeutic modalities: Client-centered Therapy, Solution-Focused Therapy, and Supportive Psychotherapy    Substance Abuse was not addressed during this session. If the client is diagnosed with a co-occurring substance use disorder, please indicate any changes in the frequency or amount of use: n/a. Stage of change for addressing substance use diagnoses: No substance use/Not applicable    ASSESSMENT:  Almita Julio presents with a Euthymic/ normal and Anxious mood.     her affect is Normal range and intensity, which is congruent, with her mood and the content of the session. The client has made progress on their goals.    Katherine was open and engaged in the session.  Almita Julio presents with a none risk of suicide, none risk of self-harm, and none risk of harm to others.    For any risk assessment that surpasses a \"low\" rating, a safety plan must be developed.    A safety plan was indicated: no  If yes, describe in detail n/a    PLAN: Between sessions, Almita Julio will utilize coping skills. At the next session, the therapist will use " Client-centered Therapy and Supportive Psychotherapy to address anxiety.    Behavioral Health Treatment Plan and Discharge Planning: Almita Julio is aware of and agrees to continue to work on their treatment plan. They have identified and are working toward their discharge goals. yes    Visit start and stop times:    08/13/24  Start Time: 0802  Stop Time: 0852  Total Visit Time: 50 minutes

## 2024-08-13 NOTE — BH TREATMENT PLAN
Outpatient Behavioral Health Psychotherapy Treatment Plan     Almita Julio  1960      Date of Initial Psychotherapy Assessment: 8/31/21   Date of Current Treatment Plan: 8/13/24  Treatment Plan Target Date: 1/9/25  Treatment Plan Expiration Date: 2/9/25     Diagnosis:   1. Bipolar 1 disorder, depressed, moderate (HCC)                Area(s) of Need: Setting boundaries, healthy communication, increased engagement in community/person hobbies.     Long Term Goal 1 (in the client's own words): set boundaries with daughter     Stage of Change: Action     Target Date for completion: 2/9/25             Anticipated therapeutic modalities: Client Centered, Solution Focused, CBT             People identified to complete this goals: Esmer             Objective 1: (identify the means of measuring success in meeting the objective): Learn to assertively communicate needs and boundaries to adult daughter                    Objective 2: (identify the means of measuring success in meeting the objective): Say 'no' to daughter to at least one event per month      Long Term Goal 2 (in the client's own words): decrease depressive symptoms     Stage of Change: Action     Target Date for completion: 2/9/25             Anticipated therapeutic modalities: Client Centered, supportive psychotherapy, CBT             People identified to complete this goal: Esmer                    Objective 1: (identify the means of measuring success in meeting the objective): Teach and practice healthy coping skills in order to decrease depressive symptoms                    Objective 2: (identify the means of measuring success in meeting the objective): Increase engagement in the community to at least one event per month outside of her home.        I am currently under the care of a [facility] psychiatric provider: no     My . [facility] psychiatric provider is: n/a  Dr Carissa Foster     I am currently taking psychiatric  medications: Yes, as prescribed     I feel that I will be ready for discharge from mental health care when I reach the following (measurable goal/objective): Be able to say no without guilt when my daughter asked me to do something I do not want to do.     For children and adults who have a legal guardian:          Has there been any change to custody orders and/or guardianship status? NA. If yes, attach updated documentation.     I have updated my Crisis Plan and have been offered a copy of this plan     Behavioral Health Treatment Plan [facility]: Diagnosis and Treatment Plan explained to Almita Julio acknowledges an understanding of their diagnosis. Almita Julio agrees to this treatment plan.     I have been offered a copy of this Treatment Plan. Yes    Almita Julio, 1960, actively participated in the review and update of this treatment plan during a virtual session, using the Epic Embedded platform.   Almita Julio  provided verbal consent on 8/13/2024 at 8:30 AM. The treatment plan was transcribed into the Electronic Health Record at a later time.

## 2024-08-19 ENCOUNTER — TELEPHONE (OUTPATIENT)
Dept: PSYCHIATRY | Facility: CLINIC | Age: 64
End: 2024-08-19

## 2024-09-03 ENCOUNTER — TELEMEDICINE (OUTPATIENT)
Dept: BEHAVIORAL/MENTAL HEALTH CLINIC | Facility: CLINIC | Age: 64
End: 2024-09-03
Payer: COMMERCIAL

## 2024-09-03 DIAGNOSIS — F31.32 BIPOLAR 1 DISORDER, DEPRESSED, MODERATE (HCC): Primary | ICD-10-CM

## 2024-09-03 PROCEDURE — 90834 PSYTX W PT 45 MINUTES: CPT | Performed by: COUNSELOR

## 2024-09-03 NOTE — PSYCH
Virtual Regular Visit    Verification of patient location:    Patient is located at Home in the following state in which I hold an active license [place]      Assessment/Plan:    Problem List Items Addressed This Visit     Bipolar 1 disorder, depressed, moderate (HCC) - Primary       Goals addressed in session: Goal 1 and Goal 2          Reason for visit is No chief complaint on file.       Encounter provider Marylu Castrejon      Recent Visits  No visits were found meeting these conditions.  Showing recent visits within past 7 days and meeting all other requirements  Today's Visits  Date Type Provider Dept   09/03/24 Telemedicine Marylu Castrejon [facility] Therapist [place]   Showing today's visits and meeting all other requirements  Future Appointments  No visits were found meeting these conditions.  Showing future appointments within next 150 days and meeting all other requirements       The patient was identified by name and date of birth. Almita Julio was informed that this is a telemedicine visit and that the visit is being conducted throughthe Epic Embedded platform. She agrees to proceed..  My office door was closed. No one else was in the room.  She acknowledged consent and understanding of privacy and security of the video platform. The patient has agreed to participate and understands they can discontinue the visit at any time.    Patient is aware this is a billable service.     Subjective  Almita Julio is a 64 y.o. female  .      HPI     No past medical history on file.    No past surgical history on file.    No current outpatient medications on file.     No current facility-administered medications for this visit.        Allergies   Allergen Reactions   • Lithium Dizziness and Headache   • Depakote [Valproic Acid] Rash   • Geodon [Ziprasidone] Rash   • Lamictal [Lamotrigine] Rash       Review of Systems    Video Exam    There were no vitals filed for this visit.    Physical Exam  "    Behavioral Health Psychotherapy Progress Note    Psychotherapy Provided: Individual Psychotherapy     1. Bipolar 1 disorder, depressed, moderate (HCC)            Goals addressed in session: Goal 1 and Goal 2     DATA: Clinician met with Katherine via telehealth for individual therapy. She discussed possible future travel plans and excitement in spending more time with her sister and brother in law. Clinician explored ways to communicate assertively in order to better plan for future. She discussed working to engage in relationship with only living sister and increase bond and support with her.  She reports overall mood has been stable and she is working to increase self care activities that support positive mood.  During this session, this clinician used the following therapeutic modalities: Client-centered Therapy, Solution-Focused Therapy, and Supportive Psychotherapy    Substance Abuse was not addressed during this session. If the client is diagnosed with a co-occurring substance use disorder, please indicate any changes in the frequency or amount of use: n/a. Stage of change for addressing substance use diagnoses: No substance use/Not applicable    ASSESSMENT:  Almita Julio presents with a Euthymic/ normal and Anxious mood.     her affect is Normal range and intensity, which is congruent, with her mood and the content of the session. The client has made progress on their goals.    Katherine was open and engaged in the session.  Almita Julio presents with a none risk of suicide, none risk of self-harm, and none risk of harm to others.    For any risk assessment that surpasses a \"low\" rating, a safety plan must be developed.    A safety plan was indicated: no  If yes, describe in detail n/a    PLAN: Between sessions, Almita Julio will utilize healthy communication skills. At the next session, the therapist will use Client-centered Therapy and Supportive Psychotherapy to address anxiety.    Behavioral Health " Treatment Plan and Discharge Planning: Almita Julio is aware of and agrees to continue to work on their treatment plan. They have identified and are working toward their discharge goals. yes    Visit start and stop times:    09/03/24  Start Time: 1008  Stop Time: 1053  Total Visit Time: 45 minutes

## 2024-09-12 ENCOUNTER — TELEMEDICINE (OUTPATIENT)
Dept: BEHAVIORAL/MENTAL HEALTH CLINIC | Facility: CLINIC | Age: 64
End: 2024-09-12
Payer: MEDICARE

## 2024-09-12 DIAGNOSIS — F31.32 BIPOLAR 1 DISORDER, DEPRESSED, MODERATE (HCC): Primary | ICD-10-CM

## 2024-09-12 PROCEDURE — 90834 PSYTX W PT 45 MINUTES: CPT | Performed by: COUNSELOR

## 2024-09-12 NOTE — PSYCH
Virtual Regular Visit    Verification of patient location:    Patient is located at Home in the following state in which I hold an active license [place]      Assessment/Plan:    Problem List Items Addressed This Visit       Bipolar 1 disorder, depressed, moderate (HCC) - Primary       Goals addressed in session: Goal 1 and Goal 2          Reason for visit is No chief complaint on file.       Encounter provider Marylu Castrejon      Recent Visits  No visits were found meeting these conditions.  Showing recent visits within past 7 days and meeting all other requirements  Today's Visits  Date Type Provider Dept   09/12/24 Telemedicine Marylu Castrejon [facility] Therapist [place]   Showing today's visits and meeting all other requirements  Future Appointments  No visits were found meeting these conditions.  Showing future appointments within next 150 days and meeting all other requirements       The patient was identified by name and date of birth. Almita Julio was informed that this is a telemedicine visit and that the visit is being conducted throughthe Epic Embedded platform. She agrees to proceed..  My office door was closed. No one else was in the room.  She acknowledged consent and understanding of privacy and security of the video platform. The patient has agreed to participate and understands they can discontinue the visit at any time.    Patient is aware this is a billable service.     Subjective  Almita Julio is a 64 y.o. female  .      HPI     No past medical history on file.    No past surgical history on file.    No current outpatient medications on file.     No current facility-administered medications for this visit.        Allergies   Allergen Reactions    Lithium Dizziness and Headache    Depakote [Valproic Acid] Rash    Geodon [Ziprasidone] Rash    Lamictal [Lamotrigine] Rash       Review of Systems    Video Exam    There were no vitals filed for this visit.    Physical Exam     Behavioral  "Health Psychotherapy Progress Note    Psychotherapy Provided: Individual Psychotherapy     1. Bipolar 1 disorder, depressed, moderate (HCC)            Goals addressed in session: Goal 1 and Goal 2     DATA: Clinician met with Katherine via telehealth for individual therapy. Katherine processed recent interaction with her daughter that she was not happy about how it was handle. Clinician explored situation and provided feedback on ways to handle similar situation differently in the future. Katherine discussed follow up with PT for pain from arthritis. She is hopeful that PT will help allow her to be more active again and states walking has been an important coping skill.  She processed continued attempts to increase independent hobbies and focus on her own self care while living with her daughter and her family.  Clinician checked in with boundaries with other adult daughter.   During this session, this clinician used the following therapeutic modalities: Client-centered Therapy, Solution-Focused Therapy, and Supportive Psychotherapy    Substance Abuse was not addressed during this session. If the client is diagnosed with a co-occurring substance use disorder, please indicate any changes in the frequency or amount of use: n/a. Stage of change for addressing substance use diagnoses: No substance use/Not applicable    ASSESSMENT:  Katherine Julio presents with a Euthymic/ normal and Anxious mood.     her affect is Normal range and intensity, which is congruent, with her mood and the content of the session. The client has made progress on their goals.    Katherine was open and engaged in the session.  Katherine Julio presents with a none risk of suicide, none risk of self-harm, and none risk of harm to others.    For any risk assessment that surpasses a \"low\" rating, a safety plan must be developed.    A safety plan was indicated: no  If yes, describe in detail n/a    PLAN: Between sessions, Katherine Julio will utilize assertive communication " skills. At the next session, the therapist will use Client-centered Therapy, Solution-Focused Therapy, and Supportive Psychotherapy to address anxiety.    Behavioral Health Treatment Plan and Discharge Planning: Katherine Julio is aware of and agrees to continue to work on their treatment plan. They have identified and are working toward their discharge goals. yes    Visit start and stop times:    09/12/24  Start Time: 0803  Stop Time: 0849  Total Visit Time: 46 minutes

## 2024-09-27 ENCOUNTER — TELEMEDICINE (OUTPATIENT)
Dept: BEHAVIORAL/MENTAL HEALTH CLINIC | Facility: CLINIC | Age: 64
End: 2024-09-27
Payer: COMMERCIAL

## 2024-09-27 DIAGNOSIS — F31.32 BIPOLAR 1 DISORDER, DEPRESSED, MODERATE (HCC): Primary | ICD-10-CM

## 2024-09-27 PROCEDURE — 90834 PSYTX W PT 45 MINUTES: CPT | Performed by: COUNSELOR

## 2024-09-27 NOTE — PSYCH
Virtual Regular Visit    Verification of patient location:    Patient is located at Home in the following state in which I hold an active license [place]      Assessment/Plan:    Problem List Items Addressed This Visit       Bipolar 1 disorder, depressed, moderate (HCC) - Primary       Goals addressed in session: Goal 1 and Goal 2          Reason for visit is No chief complaint on file.       Encounter provider Marylu Castrejon      Recent Visits  No visits were found meeting these conditions.  Showing recent visits within past 7 days and meeting all other requirements  Today's Visits  Date Type Provider Dept   09/27/24 Telemedicine Marylu Castrejon [facility] Therapist [place]   Showing today's visits and meeting all other requirements  Future Appointments  No visits were found meeting these conditions.  Showing future appointments within next 150 days and meeting all other requirements       The patient was identified by name and date of birth. Almita Julio was informed that this is a telemedicine visit and that the visit is being conducted throughthe Epic Embedded platform. She agrees to proceed..  My office door was closed. No one else was in the room.  She acknowledged consent and understanding of privacy and security of the video platform. The patient has agreed to participate and understands they can discontinue the visit at any time.    Patient is aware this is a billable service.     Subjective  Almita Julio is a 64 y.o. female  .      HPI     No past medical history on file.    No past surgical history on file.    No current outpatient medications on file.     No current facility-administered medications for this visit.        Allergies   Allergen Reactions    Lithium Dizziness and Headache    Depakote [Valproic Acid] Rash    Geodon [Ziprasidone] Rash    Lamictal [Lamotrigine] Rash       Review of Systems    Video Exam    There were no vitals filed for this visit.    Physical Exam     Behavioral  "Health Psychotherapy Progress Note    Psychotherapy Provided: Individual Psychotherapy     1. Bipolar 1 disorder, depressed, moderate (HCC)            Goals addressed in session: Goal 1 and Goal 2     DATA: Clinician met with Katherine via telehealth for individual therapy. Katherine processed issues with communication with her daughter and feeling like she is micro managing her and criticizing her. She discussed follow through with her own coping skills and engaging in the community in a meaningful way.  She reports that she recently connected with a new peer that she has found supportive. Clinician explored situation and encouraged continues balance in her life.  Clinician checked in on mental status and cycling to check for hypomania and impulsivity.  She reports taking medication as prescribed.   During this session, this clinician used the following therapeutic modalities: Client-centered Therapy and Supportive Psychotherapy    Substance Abuse was not addressed during this session. If the client is diagnosed with a co-occurring substance use disorder, please indicate any changes in the frequency or amount of use: n/a. Stage of change for addressing substance use diagnoses: No substance use/Not applicable    ASSESSMENT:  Katherine Julio presents with a Anxious mood.     her affect is Normal range and intensity, which is congruent, with her mood and the content of the session. The client has made progress on their goals.    Katherine was open and engaged in the session.  Katherine Julio presents with a none risk of suicide, none risk of self-harm, and none risk of harm to others.    For any risk assessment that surpasses a \"low\" rating, a safety plan must be developed.    A safety plan was indicated: no  If yes, describe in detail n/a    PLAN: Between sessions, Katherine Julio will utilize known supports and coping skills. At the next session, the therapist will use Client-centered Therapy and Supportive Psychotherapy to address " anxiety.    Behavioral Health Treatment Plan and Discharge Planning: Katherine Julio is aware of and agrees to continue to work on their treatment plan. They have identified and are working toward their discharge goals. yes    Visit start and stop times:    09/27/24  Start Time: 0808  Stop Time: 0856  Total Visit Time: 48 minutes

## 2024-10-11 ENCOUNTER — TELEMEDICINE (OUTPATIENT)
Dept: BEHAVIORAL/MENTAL HEALTH CLINIC | Facility: CLINIC | Age: 64
End: 2024-10-11
Payer: COMMERCIAL

## 2024-10-11 DIAGNOSIS — F31.32 BIPOLAR 1 DISORDER, DEPRESSED, MODERATE (HCC): Primary | ICD-10-CM

## 2024-10-11 PROCEDURE — 90834 PSYTX W PT 45 MINUTES: CPT | Performed by: COUNSELOR

## 2024-10-11 NOTE — PSYCH
Virtual Regular Visit    Verification of patient location:    Patient is located at Home in the following state in which I hold an active license [place]      Assessment/Plan:    Problem List Items Addressed This Visit       Bipolar 1 disorder, depressed, moderate (HCC) - Primary       Goals addressed in session: Goal 1 and Goal 2          Reason for visit is No chief complaint on file.       Encounter provider Marylu Castrejon      Recent Visits  No visits were found meeting these conditions.  Showing recent visits within past 7 days and meeting all other requirements  Today's Visits  Date Type Provider Dept   10/11/24 Telemedicine Marylu Castrejon [facility] Therapist [place]   Showing today's visits and meeting all other requirements  Future Appointments  No visits were found meeting these conditions.  Showing future appointments within next 150 days and meeting all other requirements       The patient was identified by name and date of birth. Almita Julio was informed that this is a telemedicine visit and that the visit is being conducted throughthe Epic Embedded platform. She agrees to proceed..  My office door was closed. No one else was in the room.  She acknowledged consent and understanding of privacy and security of the video platform. The patient has agreed to participate and understands they can discontinue the visit at any time.    Patient is aware this is a billable service.     Subjective  Almita Julio is a 64 y.o. female  .      HPI     No past medical history on file.    No past surgical history on file.    No current outpatient medications on file.     No current facility-administered medications for this visit.        Allergies   Allergen Reactions    Lithium Dizziness and Headache    Depakote [Valproic Acid] Rash    Geodon [Ziprasidone] Rash    Lamictal [Lamotrigine] Rash       Review of Systems    Video Exam    There were no vitals filed for this visit.    Physical Exam     Behavioral  "Health Psychotherapy Progress Note    Psychotherapy Provided: Individual Psychotherapy     1. Bipolar 1 disorder, depressed, moderate (HCC)            Goals addressed in session: Goal 1 and Goal 2     DATA: Clinician met with Katherine via telehealth for individual therapy. Katherine discussed possible scam that she has experience with online dating. Clinician explored red flags and praised self awareness and awareness of possible scam. Katherine discussed family dynamic issues with adult daughter and working to support her while also setting healthy boundaries. She states frustration with her behavior but awareness that she does not have control over situation. She also discussed follow through with her own self care, engagement in the community and social supports with family and desire to increase social engagement.  During this session, this clinician used the following therapeutic modalities: Client-centered Therapy and Supportive Psychotherapy    Substance Abuse was not addressed during this session. If the client is diagnosed with a co-occurring substance use disorder, please indicate any changes in the frequency or amount of use: n/a. Stage of change for addressing substance use diagnoses: No substance use/Not applicable    ASSESSMENT:  Katherine Julio presents with a Euthymic/ normal mood.     her affect is Normal range and intensity, which is congruent, with her mood and the content of the session. The client has made progress on their goals.    Katherine was open and engaged in the session.  Katherine Julio presents with a none risk of suicide, none risk of self-harm, and none risk of harm to others.    For any risk assessment that surpasses a \"low\" rating, a safety plan must be developed.    A safety plan was indicated: no  If yes, describe in detail n/a    PLAN: Between sessions, Katherine Julio will utilize community resources. At the next session, the therapist will use Client-centered Therapy, Solution-Focused Therapy, and " Supportive Psychotherapy to address anxiety.    Behavioral Health Treatment Plan and Discharge Planning: Katherine Julio is aware of and agrees to continue to work on their treatment plan. They have identified and are working toward their discharge goals. yes    Visit start and stop times:    10/11/24  Start Time: 0904  Stop Time: 0949  Total Visit Time: 45 minutes

## 2024-10-24 ENCOUNTER — TELEPHONE (OUTPATIENT)
Dept: PSYCHIATRY | Facility: CLINIC | Age: 64
End: 2024-10-24

## 2024-10-30 ENCOUNTER — TELEMEDICINE (OUTPATIENT)
Dept: BEHAVIORAL/MENTAL HEALTH CLINIC | Facility: CLINIC | Age: 64
End: 2024-10-30
Payer: MEDICARE

## 2024-10-30 DIAGNOSIS — F31.32 BIPOLAR 1 DISORDER, DEPRESSED, MODERATE (HCC): Primary | ICD-10-CM

## 2024-10-30 PROCEDURE — 90834 PSYTX W PT 45 MINUTES: CPT | Performed by: COUNSELOR

## 2024-10-30 NOTE — PSYCH
Virtual Regular Visit    Verification of patient location:    Patient is located at Home in the following state in which I hold an active license [place]      Assessment/Plan:    Problem List Items Addressed This Visit       Bipolar 1 disorder, depressed, moderate (HCC) - Primary       Goals addressed in session: Goal 1 and Goal 2          Reason for visit is No chief complaint on file.       Encounter provider Marylu Castrejon      Recent Visits  Date Type Provider Dept   10/30/24 Telemedicine Marylu Castrejon [facility] Therapist [place]   10/24/24 Telephone Marylu Castrejon [facility] [place]   Showing recent visits within past 7 days and meeting all other requirements  Future Appointments  No visits were found meeting these conditions.  Showing future appointments within next 150 days and meeting all other requirements       The patient was identified by name and date of birth. Almita Julio was informed that this is a telemedicine visit and that the visit is being conducted throughthe Epic Embedded platform. She agrees to proceed..  My office door was closed. No one else was in the room.  She acknowledged consent and understanding of privacy and security of the video platform. The patient has agreed to participate and understands they can discontinue the visit at any time.    Patient is aware this is a billable service.     Subjective  Almita Julio is a 64 y.o. female  .      HPI     No past medical history on file.    No past surgical history on file.    No current outpatient medications on file.     No current facility-administered medications for this visit.        Allergies   Allergen Reactions    Lithium Dizziness and Headache    Depakote [Valproic Acid] Rash    Geodon [Ziprasidone] Rash    Lamictal [Lamotrigine] Rash       Review of Systems    Video Exam    There were no vitals filed for this visit.    Physical Exam     Behavioral Health Psychotherapy Progress Note    Psychotherapy  "Provided: Individual Psychotherapy     1. Bipolar 1 disorder, depressed, moderate (HCC)            Goals addressed in session: Goal 1 and Goal 2     DATA: Clinician met with Katherine via telehealth for individual therapy. Katherine discussed frustration with adult daughter and feeling like she is has been using her and taking advantage of her. Clinician explored recent interactions with her daughter and working to communicate her feelings and boundaries more effectively. She reports that she recently fell during a walk and been actively focusing on rest and recover her strained ankle. She reports this has made helping her daughter more difficult and she has been feeling guilty.  Clinician discussed importance of self care and taking the time to recover in order to be able to help as much as possible.    During this session, this clinician used the following therapeutic modalities: Client-centered Therapy, Solution-Focused Therapy, and Supportive Psychotherapy    Substance Abuse was not addressed during this session. If the client is diagnosed with a co-occurring substance use disorder, please indicate any changes in the frequency or amount of use: n/a. Stage of change for addressing substance use diagnoses: No substance use/Not applicable    ASSESSMENT:  Katherine Julio presents with a Euthymic/ normal and Anxious mood.     her affect is Normal range and intensity, which is congruent, with her mood and the content of the session. The client has made progress on their goals.    Katherine was open and engaged in the session.  Katherine Julio presents with a none risk of suicide, none risk of self-harm, and none risk of harm to others.    For any risk assessment that surpasses a \"low\" rating, a safety plan must be developed.    A safety plan was indicated: no  If yes, describe in detail n/a    PLAN: Between sessions, Katherine Julio will utilize self care and coping skills. At the next session, the therapist will use Client-centered " Therapy and Supportive Psychotherapy to address anxiety.    Behavioral Health Treatment Plan and Discharge Planning: Katherine Julio is aware of and agrees to continue to work on their treatment plan. They have identified and are working toward their discharge goals. yes    Visit start and stop times:    10/30/24  Start Time: 1405  Stop Time: 1447  Total Visit Time: 42 minutes

## 2024-11-04 ENCOUNTER — TELEPHONE (OUTPATIENT)
Age: 64
End: 2024-11-04

## 2024-11-04 NOTE — TELEPHONE ENCOUNTER
Patient is calling regarding cancelling an appointment.    Date/Time: 11/19/24 @8am    Reason: N/A    Patient was rescheduled: YES [x] NO []  If yes, when was Patient reschedule for: 11/14/24 @1pm     Patient requesting call back to reschedule: YES [] NO [x]    Patient is calling regarding cancelling an appointment.    Date/Time: 11/21/24 @8am    Reason: Will discuss scheduling more future appointments at their next upcoming appointment.    Patient was rescheduled: YES [] NO [x]  If yes, when was Patient reschedule for: N/A    Patient requesting call back to reschedule: YES [] NO [x]

## 2024-11-04 NOTE — TELEPHONE ENCOUNTER
Patient is calling regarding cancelling an appointment.    Date/Time: 11/7    Reason: Conflicting schedule    Patient was rescheduled: YES [x] NO []  If yes, when was Patient reschedule for: 11/19    Patient requesting call back to reschedule: YES [] NO [x]

## 2024-11-14 ENCOUNTER — TELEMEDICINE (OUTPATIENT)
Dept: BEHAVIORAL/MENTAL HEALTH CLINIC | Facility: CLINIC | Age: 64
End: 2024-11-14
Payer: COMMERCIAL

## 2024-11-14 DIAGNOSIS — F31.32 BIPOLAR 1 DISORDER, DEPRESSED, MODERATE (HCC): Primary | ICD-10-CM

## 2024-11-14 PROCEDURE — 90834 PSYTX W PT 45 MINUTES: CPT | Performed by: COUNSELOR

## 2024-11-14 NOTE — PSYCH
Virtual Regular Visit    Verification of patient location:    Patient is located at Home in the following state in which I hold an active license [place]      Assessment/Plan:    Problem List Items Addressed This Visit       Bipolar 1 disorder, depressed, moderate (HCC) - Primary       Goals addressed in session: Goal 1 and Goal 2          Reason for visit is No chief complaint on file.       Encounter provider Marylu Castrejon      Recent Visits  No visits were found meeting these conditions.  Showing recent visits within past 7 days and meeting all other requirements  Today's Visits  Date Type Provider Dept   11/14/24 Telemedicine Marylu Castrejon [facility] Therapist [place]   Showing today's visits and meeting all other requirements  Future Appointments  No visits were found meeting these conditions.  Showing future appointments within next 150 days and meeting all other requirements       The patient was identified by name and date of birth. Almita Julio was informed that this is a telemedicine visit and that the visit is being conducted throughthe Epic Embedded platform. She agrees to proceed..  My office door was closed. No one else was in the room.  She acknowledged consent and understanding of privacy and security of the video platform. The patient has agreed to participate and understands they can discontinue the visit at any time.    Patient is aware this is a billable service.     Subjective  Almita Julio is a 64 y.o. female  .      HPI     No past medical history on file.    No past surgical history on file.    No current outpatient medications on file.     No current facility-administered medications for this visit.        Allergies   Allergen Reactions    Lithium Dizziness and Headache    Depakote [Valproic Acid] Rash    Geodon [Ziprasidone] Rash    Lamictal [Lamotrigine] Rash       Review of Systems    Video Exam    There were no vitals filed for this visit.    Physical Exam     Behavioral  "Health Psychotherapy Progress Note    Psychotherapy Provided: Individual Psychotherapy     1. Bipolar 1 disorder, depressed, moderate (HCC)            Goals addressed in session: Goal 1 and Goal 2     DATA: Clinician met with Katherine via telehealth for individual therapy. Katherine reports frustration with foot pain and recovery process. She reports that injury has limited her mobility and she has not been able to see her daughter and grandchildren. Clinician explored plans to discuss boundaries with her daughter to discuss her involvement in her visits with her children. Katherine reports difficulty in confronting her daughter and states she has a hard time remaining focused when they talk in person. Clinician explored possible strategies to assist with such as writing a letter or agenda for the conversation in order to feel more prepared.  Katherine also discussed upcoming holiday and desire to work on better connection with her children/family as a whole and possible ways to encouraged  this within her family.   During this session, this clinician used the following therapeutic modalities: Client-centered Therapy and Supportive Psychotherapy    Substance Abuse was not addressed during this session. If the client is diagnosed with a co-occurring substance use disorder, please indicate any changes in the frequency or amount of use: n/a. Stage of change for addressing substance use diagnoses: No substance use/Not applicable    ASSESSMENT:  Katherine Julio presents with a Euthymic/ normal and Anxious mood.     her affect is Normal range and intensity, which is congruent, with her mood and the content of the session. The client has made progress on their goals.    Katherine was open and engaged in the session.  Katherine Julio presents with a none risk of suicide, none risk of self-harm, and none risk of harm to others.    For any risk assessment that surpasses a \"low\" rating, a safety plan must be developed.    A safety plan was indicated: " no  If yes, describe in detail n/a    PLAN: Between sessions, Katherine Julio will utilize  known coping skills. At the next session, the therapist will use Client-centered Therapy and Supportive Psychotherapy to address anxiety  .    Behavioral Health Treatment Plan and Discharge Planning: Katherine Julio is aware of and agrees to continue to work on their treatment plan. They have identified and are working toward their discharge goals. yes    Visit start and stop times:    11/14/24  Start Time: 1300  Stop Time: 1347  Total Visit Time: 47 minutes

## 2024-11-15 ENCOUNTER — TELEPHONE (OUTPATIENT)
Dept: PSYCHIATRY | Facility: CLINIC | Age: 64
End: 2024-11-15

## 2024-12-05 ENCOUNTER — TELEMEDICINE (OUTPATIENT)
Dept: BEHAVIORAL/MENTAL HEALTH CLINIC | Facility: CLINIC | Age: 64
End: 2024-12-05
Payer: COMMERCIAL

## 2024-12-05 DIAGNOSIS — F31.32 BIPOLAR 1 DISORDER, DEPRESSED, MODERATE (HCC): Primary | ICD-10-CM

## 2024-12-05 PROCEDURE — 90834 PSYTX W PT 45 MINUTES: CPT | Performed by: COUNSELOR

## 2024-12-05 NOTE — PSYCH
Virtual Regular Visit    Verification of patient location:    Patient is located at Home in the following state in which I hold an active license [place]      Assessment/Plan:    Problem List Items Addressed This Visit       Bipolar 1 disorder, depressed, moderate (HCC) - Primary       Goals addressed in session: Goal 1 and Goal 2          Reason for visit is No chief complaint on file.       Encounter provider Marylu Castrejon      Recent Visits  No visits were found meeting these conditions.  Showing recent visits within past 7 days and meeting all other requirements  Today's Visits  Date Type Provider Dept   12/05/24 Telemedicine Marylu Castrejon [facility] Therapist [place]   Showing today's visits and meeting all other requirements  Future Appointments  No visits were found meeting these conditions.  Showing future appointments within next 150 days and meeting all other requirements       The patient was identified by name and date of birth. Almita Julio was informed that this is a telemedicine visit and that the visit is being conducted throughthe Epic Embedded platform. She agrees to proceed..  My office door was closed. No one else was in the room.  She acknowledged consent and understanding of privacy and security of the video platform. The patient has agreed to participate and understands they can discontinue the visit at any time.    Patient is aware this is a billable service.     Subjective  Almita Julio is a 64 y.o. female  .      HPI     No past medical history on file.    No past surgical history on file.    No current outpatient medications on file.     No current facility-administered medications for this visit.        Allergies   Allergen Reactions    Lithium Dizziness and Headache    Depakote [Valproic Acid] Rash    Geodon [Ziprasidone] Rash    Lamictal [Lamotrigine] Rash       Review of Systems    Video Exam    There were no vitals filed for this visit.    Physical Exam     Behavioral  "Health Psychotherapy Progress Note    Psychotherapy Provided: Individual Psychotherapy     1. Bipolar 1 disorder, depressed, moderate (HCC)            Goals addressed in session: Goal 1 and Goal 2     DATA: Clinician met with Katherine via telehealth for individual therapy. Katherine processed follow through with use of coping skills and states that she has reached out to friends and family to get together more in the past few weeks and benefits of this social interactions. Clinician validated and encouraged this and importance of having her own life and hobbies outside of her immediate family. She reports that she is working to set better boundaries with her daughter and processed recent interaction where she called daughter to discussed an event ahead of time to set expectation. Clinician explored how she felt about this and praised success. She discussed overall work on healthy communication of needs and areas of need in the future.   During this session, this clinician used the following therapeutic modalities: Client-centered Therapy and Supportive Psychotherapy    Substance Abuse was not addressed during this session. If the client is diagnosed with a co-occurring substance use disorder, please indicate any changes in the frequency or amount of use: n/a. Stage of change for addressing substance use diagnoses: No substance use/Not applicable    ASSESSMENT:  Katherine Julio presents with a Euthymic/ normal and Anxious mood.     her affect is Normal range and intensity, which is congruent, with her mood and the content of the session. The client has made progress on their goals.    Katherine was open and engaged in the session.  Katherine Julio presents with a none risk of suicide, none risk of self-harm, and none risk of harm to others.    For any risk assessment that surpasses a \"low\" rating, a safety plan must be developed.    A safety plan was indicated: no  If yes, describe in detail n/a    PLAN: Between sessions, Katherine " Nori will utilize social supports. At the next session, the therapist will use Client-centered Therapy and Supportive Psychotherapy to address anxiety.    Behavioral Health Treatment Plan and Discharge Planning: Katherine Julio is aware of and agrees to continue to work on their treatment plan. They have identified and are working toward their discharge goals. yes    Visit start and stop times:    12/05/24  Start Time: 0805  Stop Time: 0852  Total Visit Time: 47 minutes

## 2024-12-10 ENCOUNTER — TELEPHONE (OUTPATIENT)
Dept: PSYCHIATRY | Facility: CLINIC | Age: 64
End: 2024-12-10

## 2024-12-19 ENCOUNTER — TELEMEDICINE (OUTPATIENT)
Dept: BEHAVIORAL/MENTAL HEALTH CLINIC | Facility: CLINIC | Age: 64
End: 2024-12-19
Payer: COMMERCIAL

## 2024-12-19 DIAGNOSIS — F31.32 BIPOLAR 1 DISORDER, DEPRESSED, MODERATE (HCC): Primary | ICD-10-CM

## 2024-12-19 PROCEDURE — 90834 PSYTX W PT 45 MINUTES: CPT | Performed by: COUNSELOR

## 2024-12-19 NOTE — PSYCH
Virtual Regular Visit    Verification of patient location:    Patient is located at Home in the following state in which I hold an active license [place]      Assessment/Plan:    Problem List Items Addressed This Visit     Bipolar 1 disorder, depressed, moderate (HCC) - Primary       Goals addressed in session: Goal 1 and Goal 2     Depression Follow-up Plan Completed: Not applicable    Reason for visit is   Chief Complaint   Patient presents with   • Virtual Regular Visit          Encounter provider Marylu Castrejon      Recent Visits  No visits were found meeting these conditions.  Showing recent visits within past 7 days and meeting all other requirements  Today's Visits  Date Type Provider Dept   12/19/24 Telemedicine Marylu Castrejon [facility] Therapist [place]   Showing today's visits and meeting all other requirements  Future Appointments  No visits were found meeting these conditions.  Showing future appointments within next 150 days and meeting all other requirements       The patient was identified by name and date of birth. Almita Julio was informed that this is a telemedicine visit and that the visit is being conducted throughthe Epic Embedded platform. She agrees to proceed..  My office door was closed. No one else was in the room.  She acknowledged consent and understanding of privacy and security of the video platform. The patient has agreed to participate and understands they can discontinue the visit at any time.    Patient is aware this is a billable service.     Subjective  Almita Julio is a 64 y.o. female  .      HPI     No past medical history on file.    No past surgical history on file.    No current outpatient medications on file.     No current facility-administered medications for this visit.        Allergies   Allergen Reactions   • Lithium Dizziness and Headache   • Depakote [Valproic Acid] Rash   • Geodon [Ziprasidone] Rash   • Lamictal [Lamotrigine] Rash       Review of  "Systems    Video Exam    There were no vitals filed for this visit.    Physical Exam     Behavioral Health Psychotherapy Progress Note    Psychotherapy Provided: Individual Psychotherapy     1. Bipolar 1 disorder, depressed, moderate (HCC)            Goals addressed in session: Goal 1 and Goal 2     DATA: Clinician met with Katherine via telehealth for individual therapy. Katherine processed upcoming holiday and boundary she set with her daughter when asked to assist with transportation with her grandchildren.  Katherine reports feeling relief being able to say 'no' and plans to continued to work on better communication with daughter in the future. Katherine processed upcoming holiday plans with family. She reports looking forward to the time she will spend with grandchildren and overall states that she is feeling stable. Clinician explored work on better communication of needs and time management skills.   During this session, this clinician used the following therapeutic modalities: Client-centered Therapy and Supportive Psychotherapy    Substance Abuse was not addressed during this session. If the client is diagnosed with a co-occurring substance use disorder, please indicate any changes in the frequency or amount of use: n/a. Stage of change for addressing substance use diagnoses: No substance use/Not applicable    ASSESSMENT:  Katherine Julio presents with a Euthymic/ normal and Anxious mood.     her affect is Normal range and intensity, which is congruent, with her mood and the content of the session. The client has made progress on their goals.    Katherine was open and engaged in the session.  Katherine Julio presents with a none risk of suicide, none risk of self-harm, and none risk of harm to others.    For any risk assessment that surpasses a \"low\" rating, a safety plan must be developed.    A safety plan was indicated: no  If yes, describe in detail n/a    PLAN: Between sessions, Katherine Julio will utilize known coping skills and " social resources. At the next session, the therapist will use Client-centered Therapy and Supportive Psychotherapy to address anxiety and depressive symptoms.    Behavioral Health Treatment Plan and Discharge Planning: Katherine Julio is aware of and agrees to continue to work on their treatment plan. They have identified and are working toward their discharge goals. yes    Depression Follow-up Plan Completed: Not applicable    Visit start and stop times:    12/19/24  Start Time: 0804  Stop Time: 0850  Total Visit Time: 46 minutes

## 2025-01-02 ENCOUNTER — TELEMEDICINE (OUTPATIENT)
Dept: BEHAVIORAL/MENTAL HEALTH CLINIC | Facility: CLINIC | Age: 65
End: 2025-01-02
Payer: COMMERCIAL

## 2025-01-02 DIAGNOSIS — F31.32 BIPOLAR 1 DISORDER, DEPRESSED, MODERATE (HCC): Primary | ICD-10-CM

## 2025-01-02 PROCEDURE — 90834 PSYTX W PT 45 MINUTES: CPT | Performed by: COUNSELOR

## 2025-01-02 NOTE — PSYCH
Virtual Regular Visit    Verification of patient location:    Patient is located at Home in the following state in which I hold an active license [place]      Assessment/Plan:    Problem List Items Addressed This Visit     Bipolar 1 disorder, depressed, moderate (HCC) - Primary       Goals addressed in session: Goal 1 and Goal 2     Depression Follow-up Plan Completed: No    Reason for visit is   Chief Complaint   Patient presents with   • Virtual Regular Visit          Encounter provider Marylu Castrejon      Recent Visits  No visits were found meeting these conditions.  Showing recent visits within past 7 days and meeting all other requirements  Today's Visits  Date Type Provider Dept   01/02/25 Telemedicine Marylu Castrejon [facility] Therapist [place]   Showing today's visits and meeting all other requirements  Future Appointments  No visits were found meeting these conditions.  Showing future appointments within next 150 days and meeting all other requirements       The patient was identified by name and date of birth. Almita Julio was informed that this is a telemedicine visit and that the visit is being conducted throughthe Epic Embedded platform. She agrees to proceed..  My office door was closed. No one else was in the room.  She acknowledged consent and understanding of privacy and security of the video platform. The patient has agreed to participate and understands they can discontinue the visit at any time.    Patient is aware this is a billable service.     Subjective  Almita Julio is a 64 y.o. female  .      HPI     No past medical history on file.    No past surgical history on file.    No current outpatient medications on file.     No current facility-administered medications for this visit.        Allergies   Allergen Reactions   • Lithium Dizziness and Headache   • Depakote [Valproic Acid] Rash   • Geodon [Ziprasidone] Rash   • Lamictal [Lamotrigine] Rash       Review of Systems    Video  "Exam    There were no vitals filed for this visit.    Physical Exam     Behavioral Health Psychotherapy Progress Note    Psychotherapy Provided: Individual Psychotherapy     1. Bipolar 1 disorder, depressed, moderate (HCC)            Goals addressed in session: Goal 1 and Goal 2     DATA: Clinician met with Katherine via telehealth for individual therapy. Katherine processed recent holiday spent with her family. She reports being able to set better boundaries with her daughter and has not been providing transportation as often as she used to.  Clinician explored ways she is communicating her boundaries and benefits of assertive communication.   She discussed independent community activities that she has been engaging in and the benefits of having her own life outside of her family. Clinician explored and validated efforts made to increase healthy communication and work towards increased community engagement.   During this session, this clinician used the following therapeutic modalities: Client-centered Therapy and Supportive Psychotherapy    Substance Abuse was not addressed during this session. If the client is diagnosed with a co-occurring substance use disorder, please indicate any changes in the frequency or amount of use: n/a. Stage of change for addressing substance use diagnoses: No substance use/Not applicable    ASSESSMENT:  Katherine Julio presents with a Euthymic/ normal and Anxious mood.     her affect is Normal range and intensity, which is congruent, with her mood and the content of the session. The client has made progress on their goals.    Katherine was open and engaged in the session.  Katherine Julio presents with a none risk of suicide, none risk of self-harm, and none risk of harm to others.    For any risk assessment that surpasses a \"low\" rating, a safety plan must be developed.    A safety plan was indicated: no  If yes, describe in detail n/a    PLAN: Between sessions, Katherine Julio will utilize known coping " skills and engaged in community in a healthy way. At the next session, the therapist will use Client-centered Therapy and Supportive Psychotherapy to address anxiety.    Behavioral Health Treatment Plan and Discharge Planning: Katherine Julio is aware of and agrees to continue to work on their treatment plan. They have identified and are working toward their discharge goals. yes    Depression Follow-up Plan Completed: Not applicable    Visit start and stop times:    01/02/25  Start Time: 0800  Stop Time: 0848  Total Visit Time: 48 minutes

## 2025-01-07 ENCOUNTER — TELEPHONE (OUTPATIENT)
Dept: PSYCHIATRY | Facility: CLINIC | Age: 65
End: 2025-01-07

## 2025-01-16 ENCOUNTER — TELEMEDICINE (OUTPATIENT)
Dept: BEHAVIORAL/MENTAL HEALTH CLINIC | Facility: CLINIC | Age: 65
End: 2025-01-16
Payer: COMMERCIAL

## 2025-01-16 ENCOUNTER — TELEPHONE (OUTPATIENT)
Dept: PSYCHIATRY | Facility: CLINIC | Age: 65
End: 2025-01-16

## 2025-01-16 DIAGNOSIS — F31.32 BIPOLAR 1 DISORDER, DEPRESSED, MODERATE (HCC): Primary | ICD-10-CM

## 2025-01-16 PROCEDURE — 90834 PSYTX W PT 45 MINUTES: CPT | Performed by: COUNSELOR

## 2025-01-16 NOTE — TELEPHONE ENCOUNTER
Following Epic Staff Message received from treating provider:    Hello,     In speaking with Katherine this morning she is interested in looking into low income housing for herself in the future. She is current living with her daughter but would like to be more independent and has lived on her own in the past.  Would you be able to assist her in looking into and applying for alternative housing options?     Thank you,     Marylu

## 2025-01-16 NOTE — PSYCH
Virtual Regular Visit    Verification of patient location:    Patient is located at Home in the following state in which I hold an active license [place]      Assessment/Plan:    Problem List Items Addressed This Visit     Bipolar 1 disorder, depressed, moderate (HCC) - Primary       Goals addressed in session: Goal 1 and Goal 2     Depression Follow-up Plan Completed: Not applicable    Reason for visit is   Chief Complaint   Patient presents with   • Virtual Regular Visit          Encounter provider Marylu Castrejon      Recent Visits  No visits were found meeting these conditions.  Showing recent visits within past 7 days and meeting all other requirements  Today's Visits  Date Type Provider Dept   01/16/25 Telemedicine Marylu Castrejon [facility] Therapist [place]   Showing today's visits and meeting all other requirements  Future Appointments  No visits were found meeting these conditions.  Showing future appointments within next 150 days and meeting all other requirements       The patient was identified by name and date of birth. Almita Julio was informed that this is a telemedicine visit and that the visit is being conducted throughthe Epic Embedded platform. She agrees to proceed..  My office door was closed. No one else was in the room.  She acknowledged consent and understanding of privacy and security of the video platform. The patient has agreed to participate and understands they can discontinue the visit at any time.    Patient is aware this is a billable service.     Subjective  Almita Julio is a 64 y.o. female  .      HPI     No past medical history on file.    No past surgical history on file.    No current outpatient medications on file.     No current facility-administered medications for this visit.        Allergies   Allergen Reactions   • Lithium Dizziness and Headache   • Depakote [Valproic Acid] Rash   • Geodon [Ziprasidone] Rash   • Lamictal [Lamotrigine] Rash       Review of  "Systems    Video Exam    There were no vitals filed for this visit.    Physical Exam     Behavioral Health Psychotherapy Progress Note    Psychotherapy Provided: Individual Psychotherapy     1. Bipolar 1 disorder, depressed, moderate (HCC)            Goals addressed in session: Goal 1 and Goal 2     DATA: Clinician met with Katherine via telehealth for individual therapy. Katherine discussed communication issues with her daughter and attempting to work on better understanding of one another's feelings. Clinician provided feedback and ways to start conversations and express herself more assertively. She reports that at times she does not share with her daughter for fear of her yelling at her or judging her.  Clinician explored past history of not being listened to or understood and pattern of behavior as a defence mechanism that does not help her.  Clinician explored treatment plan goals and updated plan during the session.   During this session, this clinician used the following therapeutic modalities: Client-centered Therapy and Supportive Psychotherapy    Substance Abuse was not addressed during this session. If the client is diagnosed with a co-occurring substance use disorder, please indicate any changes in the frequency or amount of use: n/a. Stage of change for addressing substance use diagnoses: No substance use/Not applicable    ASSESSMENT:  Katherine Julio presents with a Euthymic/ normal and Anxious mood.     her affect is Normal range and intensity, which is congruent, with her mood and the content of the session. The client has made progress on their goals.    Katherine was open and engaged in the session.  Katherine Julio presents with a none risk of suicide, none risk of self-harm, and none risk of harm to others.    For any risk assessment that surpasses a \"low\" rating, a safety plan must be developed.    A safety plan was indicated: no  If yes, describe in detail n/a    PLAN: Between sessions, Katherine Julio will " utilize healthy communication and boundaries. At the next session, the therapist will use Client-centered Therapy and Supportive Psychotherapy to address anxiety.    Behavioral Health Treatment Plan and Discharge Planning: Katherine Julio is aware of and agrees to continue to work on their treatment plan. They have identified and are working toward their discharge goals. yes    Depression Follow-up Plan Completed: Not applicable    Visit start and stop times:    01/16/25  Start Time: 0800  Stop Time: 0850  Total Visit Time: 50 minutes

## 2025-01-16 NOTE — TELEPHONE ENCOUNTER
Please allow at least 7-10 business days for referrals to be processed.    Pt added to CM work queue. Please send a message to our CM Pool at [phone] if Pt contacts office in regards to a referral that is being processed.

## 2025-01-16 NOTE — BH TREATMENT PLAN
Outpatient Behavioral Health Psychotherapy Treatment Plan     Almita Julio  1960      Date of Initial Psychotherapy Assessment: 8/31/21   Date of Current Treatment Plan: 1/16.25  Treatment Plan Target Date: 6/20/25  Treatment Plan Expiration Date: 7/20/25     Diagnosis:   1. Bipolar 1 disorder, depressed, moderate (HCC)                Area(s) of Need: Setting boundaries, healthy communication, increased engagement in community/person hobbies.     Long Term Goal 1 (in the client's own words): set boundaries with daughter     Stage of Change: Action     Target Date for completion: 7/20/25             Anticipated therapeutic modalities: Client Centered, Solution Focused, CBT             People identified to complete this goals: Esmer             Objective 1: (identify the means of measuring success in meeting the objective): Learn to assertively communicate needs and boundaries to adult daughter                    Objective 2: (identify the means of measuring success in meeting the objective): Say 'no' to daughter to at least one event per month      Long Term Goal 2 (in the client's own words): decrease depressive symptoms     Stage of Change: Action     Target Date for completion: 7/20/25             Anticipated therapeutic modalities: Client Centered, supportive psychotherapy, CBT             People identified to complete this goal: Esmer                    Objective 1: (identify the means of measuring success in meeting the objective): Teach and practice healthy coping skills in order to decrease depressive symptoms                    Objective 2: (identify the means of measuring success in meeting the objective): Increase engagement in the community to at least one event per month outside of her home.        I am currently under the care of a . [facility] psychiatric provider: no     My [facility] psychiatric provider is: n/a  Dr Carissa Foster     I am currently taking  psychiatric medications: Yes, as prescribed     I feel that I will be ready for discharge from mental health care when I reach the following (measurable goal/objective): Be able to say no without guilt when my daughter asked me to do something I do not want to do.     For children and adults who have a legal guardian:          Has there been any change to custody orders and/or guardianship status? NA. If yes, attach updated documentation.     I have updated my Crisis Plan and have been offered a copy of this plan     Behavioral Health Treatment Plan [facility]: Diagnosis and Treatment Plan explained to Almita Julio acknowledges an understanding of their diagnosis. Almita Julio agrees to this treatment plan.     I have been offered a copy of this Treatment Plan. Yes    Katherine Julio, 1960, actively participated in the review and update of this treatment plan during a virtual session, using the Epic Embedded platform.   Katherine Julio  provided verbal consent on 1/16/2025 at 8:30 AM. The treatment plan was transcribed into the Electronic Health Record at a later time.

## 2025-01-30 ENCOUNTER — TELEMEDICINE (OUTPATIENT)
Dept: BEHAVIORAL/MENTAL HEALTH CLINIC | Facility: CLINIC | Age: 65
End: 2025-01-30
Payer: COMMERCIAL

## 2025-01-30 DIAGNOSIS — F31.32 BIPOLAR 1 DISORDER, DEPRESSED, MODERATE (HCC): Primary | ICD-10-CM

## 2025-01-30 PROCEDURE — 90834 PSYTX W PT 45 MINUTES: CPT | Performed by: COUNSELOR

## 2025-01-30 NOTE — PSYCH
Virtual Regular Visit    Verification of patient location:    Patient is located at Home in the following state in which I hold an active license [place]      Assessment/Plan:    Problem List Items Addressed This Visit     Bipolar 1 disorder, depressed, moderate (HCC) - Primary       Goals addressed in session: Goal 1 and Goal 2     Depression Follow-up Plan Completed: Not applicable    Reason for visit is   Chief Complaint   Patient presents with   • Virtual Regular Visit          Encounter provider Marylu Castrejon      Recent Visits  No visits were found meeting these conditions.  Showing recent visits within past 7 days and meeting all other requirements  Today's Visits  Date Type Provider Dept   01/30/25 Telemedicine Marylu Castrejon [facility] Therapist [place]   Showing today's visits and meeting all other requirements  Future Appointments  No visits were found meeting these conditions.  Showing future appointments within next 150 days and meeting all other requirements       The patient was identified by name and date of birth. Almita Julio was informed that this is a telemedicine visit and that the visit is being conducted throughthe Epic Embedded platform. She agrees to proceed..  My office door was closed. No one else was in the room.  She acknowledged consent and understanding of privacy and security of the video platform. The patient has agreed to participate and understands they can discontinue the visit at any time.    Patient is aware this is a billable service.     Subjective  Almita Julio is a 64 y.o. female  .      HPI     No past medical history on file.    No past surgical history on file.    No current outpatient medications on file.     No current facility-administered medications for this visit.        Allergies   Allergen Reactions   • Lithium Dizziness and Headache   • Depakote [Valproic Acid] Rash   • Geodon [Ziprasidone] Rash   • Lamictal [Lamotrigine] Rash       Review of  "Systems    Video Exam    There were no vitals filed for this visit.    Physical Exam     Behavioral Health Psychotherapy Progress Note    Psychotherapy Provided: Individual Psychotherapy     1. Bipolar 1 disorder, depressed, moderate (HCC)            Goals addressed in session: Goal 1 and Goal 2     DATA: Clinician met with Katherine via telehealth for indiviudal therapy. Katherine processed steps she is taking to increase healthy communication with her daughter and expressing her feelings assertively to gain understanding. Clinician provided additional feedback and suggestion of strategies to increase regular communication.  She reports taking a step back from other daughter has been helpful and she has been feeling better mentally with better boundaries. Clinician discussed upcoming maternity leave and coverage and utilizing resources as needed until she returns.  During this session, this clinician used the following therapeutic modalities: Client-centered Therapy and Supportive Psychotherapy    Substance Abuse was not addressed during this session. If the client is diagnosed with a co-occurring substance use disorder, please indicate any changes in the frequency or amount of use: n/a. Stage of change for addressing substance use diagnoses: No substance use/Not applicable    ASSESSMENT:  Katherine Julio presents with a Euthymic/ normal and Anxious mood.     her affect is Normal range and intensity, which is congruent, with her mood and the content of the session. The client has made progress on their goals.    Katherine was open and engaged in the session.  Katherine Julio presents with a none risk of suicide, none risk of self-harm, and none risk of harm to others.    For any risk assessment that surpasses a \"low\" rating, a safety plan must be developed.    A safety plan was indicated: no  If yes, describe in detail n/a    PLAN: Between sessions, Katherine Julio will utilize healthy communication skills. At the next session, the " therapist will use Client-centered Therapy and Supportive Psychotherapy to address depressive symptoms.    Behavioral Health Treatment Plan and Discharge Planning: Katherine Julio is aware of and agrees to continue to work on their treatment plan. They have identified and are working toward their discharge goals. yes    Depression Follow-up Plan Completed: Not applicable    Visit start and stop times:    01/30/25  Start Time: 0805  Stop Time: 0850  Total Visit Time: 45 minutes

## 2025-05-13 ENCOUNTER — DOCUMENTATION (OUTPATIENT)
Dept: BEHAVIORAL/MENTAL HEALTH CLINIC | Facility: CLINIC | Age: 65
End: 2025-05-13

## 2025-05-13 NOTE — PROGRESS NOTES
TRANSFER SUMMARY    [facility]    Patient Name Almita Julio     Date of Birth: 65 y.o. 1960      MRN: [id number]    Admission Date: several years ago    Date of Transfer: May 13, 2025    Admission Diagnosis:     Bipolar Disorder, type I, depressed, moderate    Current Diagnosis:     No diagnosis found.    Reason for Admission: Almita presented for treatment due to depression, mood instability, and manic symptoms. Primary complaints included DEPRESSIVE SYMPTOMS: depressed mood, ruminations and BIPOLAR DISORDER SYMPTOMS: elevated mood, increased irritability, mood swings. Katherine transitioned to [facility] from providers previous employer. She  has been working on managing symptoms of bipolar for many years. She worked to maintain boundaries with her friends and family, utilize coping skills appropriately and engage in the community in a meaningful way.    Progress in Treatment: Almita was seen for Individual Couseling. During the course of treatment she has engaged in meaningful way in the community and increase social interactions She has worked through past trauma and previous relationships and working to set more healthy boundaries.    Episodes of Higher Level of Care: No    Transfer request Initiated by: Psychiatrist: Ruben Therapist: Marylu Castrejon    Reason for Transfer Request:  insurance    Does this individual need a clinician with specialized training/expertise?: No    Is this client working with any other [facility] Providers/Therapists? Psychiatrist: Nurse Practitioner with outside practice Dr Carissa Foster  Therapist: None    Other pertinent issues: None    Are there any specific individuals who would be a “best fit” or who have already agreed to accept this transfer request?      Psychiatrist: None   Therapist: None  Rationale: Not Applicable    Attempts to maintain the current therapeutic relationship: Not Applicable    Transfer request routed  to Clinical Supervisor for input and/or approval.     Comments from other involved providers and/or clinical coordinator : None    Marylu Castrejon05/13/25

## 2025-05-20 ENCOUNTER — TELEPHONE (OUTPATIENT)
Dept: PSYCHIATRY | Facility: CLINIC | Age: 65
End: 2025-05-20

## 2025-05-20 NOTE — TELEPHONE ENCOUNTER
Called and spoke with patient to schedule TIANNA for talk therapy due to insurance. Scheduled TIANNA for 6/2 2PM in office. Patient will continue appts virtually.

## 2025-05-28 ENCOUNTER — TELEPHONE (OUTPATIENT)
Age: 65
End: 2025-05-28

## 2025-05-28 NOTE — TELEPHONE ENCOUNTER
Patient is calling regarding cancelling an appointment.    Date/Time: 6/2 2pm    Reason: did not disclose    Patient was rescheduled: YES [] NO [x]  Pt stated she will callback at a later time to reschedule